# Patient Record
Sex: FEMALE | Race: WHITE | NOT HISPANIC OR LATINO | Employment: OTHER | URBAN - METROPOLITAN AREA
[De-identification: names, ages, dates, MRNs, and addresses within clinical notes are randomized per-mention and may not be internally consistent; named-entity substitution may affect disease eponyms.]

---

## 2017-02-10 DIAGNOSIS — M79.642 BILATERAL HAND PAIN: Primary | ICD-10-CM

## 2017-02-10 DIAGNOSIS — M79.641 BILATERAL HAND PAIN: Primary | ICD-10-CM

## 2017-02-13 ENCOUNTER — OFFICE VISIT (OUTPATIENT)
Dept: ORTHOPEDICS | Facility: CLINIC | Age: 82
End: 2017-02-13
Payer: MEDICARE

## 2017-02-13 ENCOUNTER — HOSPITAL ENCOUNTER (OUTPATIENT)
Dept: RADIOLOGY | Facility: HOSPITAL | Age: 82
Discharge: HOME | End: 2017-02-13
Attending: ORTHOPAEDIC SURGERY
Payer: MEDICARE

## 2017-02-13 VITALS
HEART RATE: 73 BPM | WEIGHT: 145 LBS | HEIGHT: 64 IN | SYSTOLIC BLOOD PRESSURE: 141 MMHG | BODY MASS INDEX: 24.75 KG/M2 | DIASTOLIC BLOOD PRESSURE: 73 MMHG

## 2017-02-13 DIAGNOSIS — G56.03 BILATERAL CARPAL TUNNEL SYNDROME: Primary | ICD-10-CM

## 2017-02-13 DIAGNOSIS — M19.049 OSTEOARTHRITIS, HAND, PRIMARY LOCALIZED, UNSPECIFIED LATERALITY: ICD-10-CM

## 2017-02-13 DIAGNOSIS — M79.641 BILATERAL HAND PAIN: ICD-10-CM

## 2017-02-13 DIAGNOSIS — M79.642 BILATERAL HAND PAIN: ICD-10-CM

## 2017-02-13 PROCEDURE — 73130 X-RAY EXAM OF HAND: CPT | Mod: 26,RT,, | Performed by: RADIOLOGY

## 2017-02-13 PROCEDURE — 1159F MED LIST DOCD IN RCRD: CPT | Mod: S$GLB,,, | Performed by: ORTHOPAEDIC SURGERY

## 2017-02-13 PROCEDURE — 99213 OFFICE O/P EST LOW 20 MIN: CPT | Mod: 25,S$GLB,, | Performed by: ORTHOPAEDIC SURGERY

## 2017-02-13 PROCEDURE — 1157F ADVNC CARE PLAN IN RCRD: CPT | Mod: S$GLB,,, | Performed by: ORTHOPAEDIC SURGERY

## 2017-02-13 PROCEDURE — 1160F RVW MEDS BY RX/DR IN RCRD: CPT | Mod: S$GLB,,, | Performed by: ORTHOPAEDIC SURGERY

## 2017-02-13 PROCEDURE — 73130 X-RAY EXAM OF HAND: CPT | Mod: 26,LT,, | Performed by: RADIOLOGY

## 2017-02-13 PROCEDURE — 99999 PR PBB SHADOW E&M-EST. PATIENT-LVL III: CPT | Mod: PBBFAC,,, | Performed by: ORTHOPAEDIC SURGERY

## 2017-02-13 PROCEDURE — 1125F AMNT PAIN NOTED PAIN PRSNT: CPT | Mod: S$GLB,,, | Performed by: ORTHOPAEDIC SURGERY

## 2017-02-15 PROCEDURE — 20526 THER INJECTION CARP TUNNEL: CPT | Mod: 50,S$GLB,, | Performed by: ORTHOPAEDIC SURGERY

## 2017-02-15 RX ORDER — TRIAMCINOLONE ACETONIDE 40 MG/ML
40 INJECTION, SUSPENSION INTRA-ARTICULAR; INTRAMUSCULAR
Status: DISCONTINUED | OUTPATIENT
Start: 2017-02-15 | End: 2017-02-15 | Stop reason: HOSPADM

## 2017-02-15 RX ADMIN — TRIAMCINOLONE ACETONIDE 40 MG: 40 INJECTION, SUSPENSION INTRA-ARTICULAR; INTRAMUSCULAR at 07:02

## 2017-02-15 NOTE — PROGRESS NOTES
Past Medical History   Diagnosis Date    CHF (congestive heart failure)     Coronary artery disease     Depression     Fractures        Past Surgical History   Procedure Laterality Date    Hysterectomy      Cholecystectomy      Tonsillectomy      Breast surgery      Back surgery      Bladder suspension      Hip surgery      Knee surgery Right      1999       Current Outpatient Prescriptions   Medication Sig    artificial tears (ISOPTO TEARS) 0.5 % ophthalmic solution Place 1 drop into both eyes 3 (three) times daily as needed.    aspirin 81 MG Chew Take 81 mg by mouth once daily.      CALCIUM & MAGNESIUM CARBONATES ORAL Take by mouth.    carvedilol (COREG) 12.5 MG tablet Take 12.5 mg by mouth every evening.     carvedilol (COREG) 3.125 MG tablet Take 3.125 mg by mouth 2 (two) times daily.    citalopram (CELEXA) 20 MG tablet Take 20 mg by mouth once daily.    clopidogrel (PLAVIX) 75 mg tablet Take 75 mg by mouth once daily.      coenzyme Q10 100 mg capsule Take 100 mg by mouth once daily.    cyanocobalamin (VITAMIN B-12) 500 MCG tablet Take 500 mcg by mouth once daily.    cyclobenzaprine (FLEXERIL) 10 MG tablet Take 1 tablet (10 mg total) by mouth 3 (three) times daily as needed.    diclofenac sodium 1 % Gel Apply 2 g topically 2 (two) times daily.    dicyclomine (BENTYL) 10 MG capsule     estradiol (VIVELLE-DOT) 0.05 mg/24 hr Place 1 patch onto the skin once a week.      expressed human breast milk (EXPRESSED HUMAN BREAST MILK) APPLY 1-2 GRAMS TO AFFECTED AREA 3-4 TIMES DAILY. RUB IN THOROUGHLY    fesoterodine (TOVIAZ) 4 mg Tb24 Take 4 mg by mouth once daily.    furosemide (LASIX) 40 MG tablet Take 40 mg by mouth once daily.     multivitamin capsule Take 1 capsule by mouth once daily.    omega-3 acid ethyl esters (LOVAZA) 1 gram capsule Take 2 g by mouth 2 (two) times daily.    polyethylene glycol (GLYCOLAX) 17 gram PwPk Take by mouth.    potassium chloride (KLOR-CON) 10 MEQ TbSR Take  10 mEq by mouth once daily.      spironolactone (ALDACTONE) 25 MG tablet Take 25 mg by mouth once daily.      tramadol (ULTRAM) 50 mg tablet Take 1 tablet (50 mg total) by mouth every 6 (six) hours as needed for Pain.    pantoprazole (PROTONIX) 40 MG tablet Take 1 tablet (40 mg total) by mouth once daily.     No current facility-administered medications for this visit.        Review of patient's allergies indicates:  No Known Allergies    History reviewed. No pertinent family history.    Social History     Social History    Marital status:      Spouse name: N/A    Number of children: N/A    Years of education: N/A     Occupational History    Not on file.     Social History Main Topics    Smoking status: Never Smoker    Smokeless tobacco: Not on file    Alcohol use Not on file    Drug use: No    Sexual activity: No     Other Topics Concern    Not on file     Social History Narrative       Chief Complaint:   Chief Complaint   Patient presents with    Hand Pain     bilateral hand pain       History: This in a 88-year-old female comes in with left shoulder pain now.  Pain has been present for about a year.  No injury noted.  Pain is getting significantly worse.  She is unable to lay on that shoulder raise her arm above her head.  Patient enters a crunching noise.  Pain is not constant but pretty significant.  She rates her pain as a 7 out of 10.  No prior treatment noted.    Present: She comes in today for worsening bilateral hand pain.  Patient has a history of carpal tunnel syndrome.  We injected both carpal tunnels back in June.  She got several months of relief.  Pain over the last few weeks.  Pain is back up to 10 out of 10.    Review of Systems:      Musculoskeletal: See history of present illness.      Physical Examination:    Vital Signs:    Vitals:    02/13/17 1509   BP: (!) 141/73   Pulse: 73       This a well-developed, well nourished patient in no acute distress.  They are alert and  oriented and cooperative to examination.  Pt. walks without an antalgic gait.      Examination of bilateral hand and wrist shows no signs of rashes or erythema. Patient has no masses ecchymosis or effusions. Patient has full range of motion of the wrist in flexion and extension as well as ulnar and radial deviation. The patient also has full range of motion of all joints in the hand. There are 2+ radial pulse and intact light touch sensation in all 5 digits.  Positive median Tinel's.      X-rays: 4 views of the left shoulder are reviewed which show arthritis and findings consistent with rotator cuff arthropathy  3 views of bilateral hands are  ordered and reviewed which show severe arthritic changes with bony deformity.     Assessment::   Bilateral severe hand arthritis.   bilateral carpal tunnel syndrome    Plan:  I reviewed the findings the patient and her daughter today.  We talked about treatment options.  Injected both carpal tunnels again.  Follow-up as needed.

## 2017-02-15 NOTE — PROCEDURES
Carpal Tunnel  Date/Time: 2/15/2017 7:10 AM  Performed by: KADI BASURTO  Authorized by: KADI BASURTO     Consent Done?:  Yes (Verbal)  Indications:  Pain  Site marked: The procedure site was marked    Timeout: Prior to procedure the correct patient, procedure, and site was verified      Location:  Wrist  Site:  R radiocarpal and L radiocarpal  Prep: Patient was prepped and draped in usual sterile fashion    Ultrasonic Guidance for needle placement: No  Needle size:  22 G  Approach:  Volar  Medications:  40 mg triamcinolone acetonide 40 mg/mL; 40 mg triamcinolone acetonide 40 mg/mL  Patient tolerance:  Patient tolerated the procedure well with no immediate complications

## 2017-03-28 ENCOUNTER — TELEPHONE (OUTPATIENT)
Dept: ORTHOPEDICS | Facility: CLINIC | Age: 82
End: 2017-03-28

## 2017-03-28 NOTE — TELEPHONE ENCOUNTER
----- Message from Stacey M Lefort sent at 3/28/2017  1:48 PM CDT -----  Contact: Anna Julian - Daughter - [phone]  Patient needs a refill on her pain cream. She uses [facility] mail order. Daughter would like a callback at [phone]. Thank you.

## 2017-07-31 ENCOUNTER — HOSPITAL ENCOUNTER (EMERGENCY)
Facility: HOSPITAL | Age: 82
Discharge: HOME | End: 2017-07-31
Attending: EMERGENCY MEDICINE
Payer: MEDICARE

## 2017-07-31 VITALS
RESPIRATION RATE: 12 BRPM | SYSTOLIC BLOOD PRESSURE: 146 MMHG | TEMPERATURE: 99 F | HEIGHT: 64 IN | HEART RATE: 73 BPM | BODY MASS INDEX: 24.75 KG/M2 | DIASTOLIC BLOOD PRESSURE: 63 MMHG | OXYGEN SATURATION: 99 % | WEIGHT: 145 LBS

## 2017-07-31 DIAGNOSIS — S81.801A LEG WOUND, RIGHT, INITIAL ENCOUNTER: Primary | ICD-10-CM

## 2017-07-31 PROCEDURE — 99283 EMERGENCY DEPT VISIT LOW MDM: CPT

## 2017-07-31 RX ORDER — CEPHALEXIN 500 MG/1
500 CAPSULE ORAL EVERY 12 HOURS
Qty: 14 CAPSULE | Refills: 0 | Status: SHIPPED | OUTPATIENT
Start: 2017-07-31 | End: 2017-08-07

## 2017-07-31 NOTE — ED NOTES
Pt presents with wound that is not healing after 10 days of Doxycycline. Wound area has eschar at center, skin is dry, no drainage or odor. Good blood flow. Neg Homans sign.

## 2017-07-31 NOTE — ED PROVIDER NOTES
Encounter Date: 7/31/2017       History     Chief Complaint   Patient presents with    Leg Injury     Posterior R lower leg wound from 4 weeks ago. Continues with pain after Doxycycline 100 bid x 10 days.     Patient is an 88 year old female with complaint of wound to the right lower extremity that has been present for more than two weeks. She states PMH significant for CHF, CAD and depression. Daughter states the initial injury was a scrape to the back of the leg. She went to Urgent Care and was started on doxycyline for 10 days. The daughter reports the wound is still present. They reports mild improvement in the redness surroudning the wound. They denied drainage and fever. She denied chills.       The history is provided by the patient and a relative.     Review of patient's allergies indicates:  No Known Allergies  Past Medical History:   Diagnosis Date    CHF (congestive heart failure)     Coronary artery disease     Depression     Fractures      Past Surgical History:   Procedure Laterality Date    BACK SURGERY      BLADDER SUSPENSION      BREAST SURGERY      CHOLECYSTECTOMY      HIP SURGERY      HYSTERECTOMY      KNEE SURGERY Right     1999    TONSILLECTOMY       History reviewed. No pertinent family history.  Social History   Substance Use Topics    Smoking status: Never Smoker    Smokeless tobacco: Never Used    Alcohol use Not on file     Review of Systems   Constitutional: Negative for chills and fever.   HENT: Negative for congestion and sore throat.    Respiratory: Negative for cough and shortness of breath.    Cardiovascular: Negative for chest pain.   Gastrointestinal: Negative for abdominal pain, diarrhea, nausea and vomiting.   Genitourinary: Negative for dysuria.   Musculoskeletal: Negative for back pain.   Skin: Positive for wound. Negative for rash.   Neurological: Negative for weakness.   Hematological: Does not bruise/bleed easily.       Physical Exam     Initial Vitals  [07/31/17 1349]   BP Pulse Resp Temp SpO2   (!) 146/63 73 12 98.8 °F (37.1 °C) 99 %      MAP       90.67         Physical Exam    Nursing note and vitals reviewed.  Constitutional: She appears well-developed and well-nourished. No distress.   HENT:   Head: Normocephalic and atraumatic.   Right Ear: External ear normal.   Left Ear: External ear normal.   Nose: Nose normal.   Eyes: Conjunctivae are normal. Pupils are equal, round, and reactive to light. Right eye exhibits no discharge. Left eye exhibits no discharge.   Neck: Normal range of motion. Neck supple.   Cardiovascular: Normal rate, regular rhythm and normal heart sounds. Exam reveals no gallop and no friction rub.    No murmur heard.  Pulmonary/Chest: Breath sounds normal. She has no wheezes. She has no rhonchi. She has no rales.   Abdominal: Soft. Bowel sounds are normal. There is no tenderness. There is no guarding.   Musculoskeletal: Normal range of motion.   Neurological: She is alert.   Skin: Skin is warm and dry.              ED Course   Procedures  Labs Reviewed - No data to display          Medical Decision Making:   History:   I obtained history from: someone other than patient.  Old Medical Records: I decided to obtain old medical records.       APC / Resident Notes:   This is an emergent evaluation of an 88-year-old female with complaint of wound to the right lower extremity.  She states she completed a 10 day course of doxycycline with no significant improvement.  She is well-appearing.  Vital signs are stable.  She denied fever.  There is a 2 cm x 2 cm black eschar noted.  There is no abscess, erythema, drainage or warmth.  There is minimal tenderness.  No significant sign of infection at this time.  Pulses are noted.  Her cap refills less than 3 seconds.  There is no concern for compromise blood flow at this time.  We'll give a short course of Keflex and have her follow up with outpatient wound care.  Case management was working to set up wound  care.  Daughter voiced understanding. Discussed results with patient. Return precautions given. Patient is to follow up with their primary care provider. Case was discussed with Dr. June who has evaluated the patient and is in agreement with the plan of care. All questions answered.            Attending Attestation:     Physician Attestation Statement for NP/PA:   I have conducted a face to face encounter with this patient in addition to the NP/PA, due to Medical Complexity    Other NP/PA Attestation Additions:      Medical Decision Making: There does not appear to be any significant acute infection. I do have some concern that dry gangrene may be developing.  There is no subcutaneous gas.  I will discharge the patient with antibiotics and instructions to follow up with wound care.  She is neurovascularly intact distally.                 ED Course     Clinical Impression:   The encounter diagnosis was Leg wound, right, initial encounter.                           Rebecca Bah PA-C  07/31/17 9936       Samuel June MD  07/31/17 2997

## 2017-07-31 NOTE — DISCHARGE INSTRUCTIONS
Keep wound clean and dry.  Take antibiotics as prescribed.  Wound clinic will call you.  See her primary care provider in one week.  For worsening symptoms, chest pain, shortness of breath, increased abdominal pain, high grade fever, stroke or stroke like symptoms, immediately go to the nearest Emergency Room or call 911 as soon as possible.

## 2017-08-08 DIAGNOSIS — M25.561 PAIN IN BOTH KNEES, UNSPECIFIED CHRONICITY: Primary | ICD-10-CM

## 2017-08-08 DIAGNOSIS — M25.562 PAIN IN BOTH KNEES, UNSPECIFIED CHRONICITY: Primary | ICD-10-CM

## 2017-08-19 ENCOUNTER — HOSPITAL ENCOUNTER (EMERGENCY)
Facility: HOSPITAL | Age: 82
Discharge: HOME | End: 2017-08-19
Attending: EMERGENCY MEDICINE
Payer: MEDICARE

## 2017-08-19 VITALS
WEIGHT: 149 LBS | OXYGEN SATURATION: 96 % | HEART RATE: 75 BPM | SYSTOLIC BLOOD PRESSURE: 159 MMHG | BODY MASS INDEX: 25.58 KG/M2 | TEMPERATURE: 98 F | RESPIRATION RATE: 16 BRPM | DIASTOLIC BLOOD PRESSURE: 68 MMHG

## 2017-08-19 DIAGNOSIS — M25.561 RIGHT KNEE PAIN: ICD-10-CM

## 2017-08-19 PROCEDURE — 25000003 PHARM REV CODE 250: Performed by: EMERGENCY MEDICINE

## 2017-08-19 PROCEDURE — 99283 EMERGENCY DEPT VISIT LOW MDM: CPT

## 2017-08-19 RX ORDER — ACETAMINOPHEN 325 MG/1
650 TABLET ORAL
Status: COMPLETED | OUTPATIENT
Start: 2017-08-19 | End: 2017-08-19

## 2017-08-19 RX ORDER — DOXYCYCLINE HYCLATE 100 MG/1
100 TABLET, DELAYED RELEASE ORAL 2 TIMES DAILY
Status: ON HOLD | COMMUNITY
End: 2017-11-06

## 2017-08-19 RX ADMIN — ACETAMINOPHEN 650 MG: 325 TABLET ORAL at 01:08

## 2017-08-19 NOTE — ED PROVIDER NOTES
"Encounter Date: 8/19/2017    SCRIBE #1 NOTE: I, Arron Bowens, am scribing for, and in the presence of, Dr. Amaya .       History     Chief Complaint   Patient presents with    Knee Pain     right knee "popped out"     08/19/2017  1:37 PM     Chief Complaint: knee problem     The patient is a 88 y.o. female presents to the ED c/o right knee pain with swelling that worsened this AM. Pt reports "popping" of the knee out of place upon standing up that began months ago. She has been treating pain with Tylenol. Pt reports a knee replacement performed 17 years ago and has been seeing Dr. Palacios. Pt uses a walker and reports chronic difficulty walking. No recent falls or fevers. No numbness or weakness in knees. PMHx of CHF, CAD, depression and Fractures and PSHx of hysterectomy, cholecystectomy, breast surgery, back surgery, bladder suspension, hip surgery.        The history is provided by the patient, a relative and medical records.     Review of patient's allergies indicates:  No Known Allergies  Past Medical History:   Diagnosis Date    CHF (congestive heart failure)     Coronary artery disease     Depression     Fractures      Past Surgical History:   Procedure Laterality Date    BACK SURGERY      BLADDER SUSPENSION      BREAST SURGERY      CHOLECYSTECTOMY      HIP SURGERY      HYSTERECTOMY      KNEE SURGERY Right     1999    TONSILLECTOMY       History reviewed. No pertinent family history.  Social History   Substance Use Topics    Smoking status: Never Smoker    Smokeless tobacco: Never Used    Alcohol use Not on file     Review of Systems   Constitutional: Negative for fever.   HENT: Negative for sore throat.    Respiratory: Negative for shortness of breath.    Cardiovascular: Negative for chest pain.   Gastrointestinal: Negative for nausea.   Genitourinary: Negative for dysuria.   Musculoskeletal: Positive for arthralgias (Right knee). Negative for back pain.   Skin: Negative for rash. "   Neurological: Negative for weakness and numbness.   Hematological: Does not bruise/bleed easily.       Physical Exam     Initial Vitals [08/19/17 1244]   BP Pulse Resp Temp SpO2   (!) 159/68 75 16 97.5 °F (36.4 °C) 96 %      MAP       98.33         Physical Exam    Nursing note and vitals reviewed.  Constitutional: She appears well-developed. No distress.   HENT:   Head: Normocephalic and atraumatic.   Eyes: EOM are normal.   Neck: Normal range of motion.   Cardiovascular: Normal rate and normal heart sounds.   Pulses:       Dorsalis pedis pulses are 2+ on the right side, and 2+ on the left side.        Posterior tibial pulses are 2+ on the right side, and 2+ on the left side.   Pulmonary/Chest: Breath sounds normal. No respiratory distress.   Musculoskeletal:   small right joint effusion, no erythema. Minimal diffuse anterior tenderness, FROM. No minimal tenderness to palpation.      Neurological: She is alert and oriented to person, place, and time. She has normal strength. No sensory deficit.   Skin: Skin is warm and dry. No erythema.         ED Course   Procedures  Labs Reviewed - No data to display              Imaging Results          X-Ray Knee 3 View Right (Final result)  Result time 08/19/17 14:21:36    Final result by Angelika Collins MD (08/19/17 14:21:36)                 Impression:        Right TKA.  No acute fracture or dislocation.      Electronically signed by: ANGELIKA COLLINS MD  Date:     08/19/17  Time:    14:21              Narrative:    Right knee 3 views AP, lateral, merchant    There is right TKA appearing in good position and alignment.  Extensive arterial calcifications.  There are suprapatellar calcification that could be vascular or other soft tissue calcifications.  No acute fracture or dislocation is seen.                            (radiology reading, visualized by me)          Scribe Attestation:   Scribe #1: I performed the above scribed service and the documentation accurately  describes the services I performed. I attest to the accuracy of the note.    Attending Attestation:           Physician Attestation for Scribe:  Physician Attestation Statement for Scribe #1: I, Dr. Amaya, reviewed documentation, as scribed by Arron Bowens  in my presence, and it is both accurate and complete.         Jessenia Mckeon is a 88 y.o. female presenting with acute on chronic right knee pain with history of remote right knee replacement.  No trauma.  She has full active range of motion and is distally neurovascular intact.  X-ray shows no sign of hardware disruption, fracture, dislocation.  I doubt dislocation/relocation with vascular injury.  Last sensation of popping occurred hours before current presentation.  This has been recurrent phenomenon.  She has close orthopedic follow-up in 2 days.  I did attempt to call Dr. Molina whom they will be seeing at their request.  He is not on-call and I was not able to reach him.  Fall precautions with NSAIDs as necessary reviewed pending close follow-up.  Detailed return precautions reviewed.  Very low suspicion for separate process such as septic joint.        ED Course     Clinical Impression:   The encounter diagnosis was Right knee pain.                           William Amaya MD  08/19/17 3100

## 2017-08-21 ENCOUNTER — OFFICE VISIT (OUTPATIENT)
Dept: ORTHOPEDICS | Facility: CLINIC | Age: 82
End: 2017-08-21
Attending: ORTHOPAEDIC SURGERY
Payer: MEDICARE

## 2017-08-21 ENCOUNTER — HOSPITAL ENCOUNTER (OUTPATIENT)
Dept: RADIOLOGY | Facility: HOSPITAL | Age: 82
Discharge: HOME | End: 2017-08-21
Attending: ORTHOPAEDIC SURGERY
Payer: MEDICARE

## 2017-08-21 VITALS
WEIGHT: 149 LBS | DIASTOLIC BLOOD PRESSURE: 64 MMHG | HEART RATE: 57 BPM | BODY MASS INDEX: 25.44 KG/M2 | SYSTOLIC BLOOD PRESSURE: 136 MMHG | HEIGHT: 64 IN

## 2017-08-21 DIAGNOSIS — M17.12 ARTHRITIS OF KNEE, LEFT: ICD-10-CM

## 2017-08-21 DIAGNOSIS — T84.84XA PAIN DUE TO TOTAL RIGHT KNEE REPLACEMENT, INITIAL ENCOUNTER: ICD-10-CM

## 2017-08-21 DIAGNOSIS — M25.561 PAIN IN BOTH KNEES, UNSPECIFIED CHRONICITY: ICD-10-CM

## 2017-08-21 DIAGNOSIS — G56.03 BILATERAL CARPAL TUNNEL SYNDROME: Primary | ICD-10-CM

## 2017-08-21 DIAGNOSIS — Z96.651 PAIN DUE TO TOTAL RIGHT KNEE REPLACEMENT, INITIAL ENCOUNTER: ICD-10-CM

## 2017-08-21 DIAGNOSIS — M25.562 PAIN IN BOTH KNEES, UNSPECIFIED CHRONICITY: ICD-10-CM

## 2017-08-21 PROCEDURE — 20526 THER INJECTION CARP TUNNEL: CPT | Mod: LT,S$GLB,, | Performed by: ORTHOPAEDIC SURGERY

## 2017-08-21 PROCEDURE — 20610 DRAIN/INJ JOINT/BURSA W/O US: CPT | Mod: 59,LT,S$GLB, | Performed by: ORTHOPAEDIC SURGERY

## 2017-08-21 PROCEDURE — 73564 X-RAY EXAM KNEE 4 OR MORE: CPT | Mod: 26,LT,, | Performed by: RADIOLOGY

## 2017-08-21 PROCEDURE — 1125F AMNT PAIN NOTED PAIN PRSNT: CPT | Mod: S$GLB,,, | Performed by: ORTHOPAEDIC SURGERY

## 2017-08-21 PROCEDURE — 3008F BODY MASS INDEX DOCD: CPT | Mod: S$GLB,,, | Performed by: ORTHOPAEDIC SURGERY

## 2017-08-21 PROCEDURE — 73564 X-RAY EXAM KNEE 4 OR MORE: CPT | Mod: 26,RT,, | Performed by: RADIOLOGY

## 2017-08-21 PROCEDURE — 73564 X-RAY EXAM KNEE 4 OR MORE: CPT | Mod: TC,50,PN

## 2017-08-21 PROCEDURE — 1159F MED LIST DOCD IN RCRD: CPT | Mod: S$GLB,,, | Performed by: ORTHOPAEDIC SURGERY

## 2017-08-21 PROCEDURE — 99214 OFFICE O/P EST MOD 30 MIN: CPT | Mod: 25,S$GLB,, | Performed by: ORTHOPAEDIC SURGERY

## 2017-08-21 PROCEDURE — 99999 PR PBB SHADOW E&M-EST. PATIENT-LVL III: CPT | Mod: PBBFAC,,, | Performed by: ORTHOPAEDIC SURGERY

## 2017-08-21 RX ADMIN — TRIAMCINOLONE ACETONIDE 40 MG: 40 INJECTION, SUSPENSION INTRA-ARTICULAR; INTRAMUSCULAR at 07:08

## 2017-08-22 ENCOUNTER — TELEPHONE (OUTPATIENT)
Dept: ORTHOPEDICS | Facility: CLINIC | Age: 82
End: 2017-08-22

## 2017-08-22 NOTE — TELEPHONE ENCOUNTER
----- Message from Camila Wing LPN sent at 8/21/2017  4:20 PM CDT -----  Pt being referred to Dr. Rios by Dr. Palacios for right loose total knee replacement. Please call pt to make appointment. Thanks!

## 2017-08-22 NOTE — TELEPHONE ENCOUNTER
Spoke to Anna and the pt was given an appointment with  on 9/20/17. The appointment slip was mailed to pt home.

## 2017-08-23 ENCOUNTER — TELEPHONE (OUTPATIENT)
Dept: ORTHOPEDICS | Facility: CLINIC | Age: 82
End: 2017-08-23

## 2017-08-23 PROBLEM — M17.12 ARTHRITIS OF KNEE, LEFT: Status: ACTIVE | Noted: 2017-08-23

## 2017-08-23 PROBLEM — Z96.651 PAIN DUE TO TOTAL RIGHT KNEE REPLACEMENT: Status: ACTIVE | Noted: 2017-08-23

## 2017-08-23 PROBLEM — T84.84XA PAIN DUE TO TOTAL RIGHT KNEE REPLACEMENT: Status: ACTIVE | Noted: 2017-08-23

## 2017-08-23 RX ORDER — TRIAMCINOLONE ACETONIDE 40 MG/ML
40 INJECTION, SUSPENSION INTRA-ARTICULAR; INTRAMUSCULAR
Status: DISCONTINUED | OUTPATIENT
Start: 2017-08-21 | End: 2017-08-23 | Stop reason: HOSPADM

## 2017-08-23 NOTE — PROCEDURES
Large Joint Aspiration/Injection  Date/Time: 8/21/2017 7:31 AM  Performed by: KADI BASURTO  Authorized by: KADI BASURTO     Consent Done?:  Yes (Verbal)  Indications:  Pain  Procedure site marked: Yes    Timeout: Prior to procedure the correct patient, procedure, and site was verified      Location:  Knee  Site:  L knee  Prep: Patient was prepped and draped in usual sterile fashion    Needle size:  20 G  Approach:  Anterolateral  Medications:  40 mg triamcinolone acetonide 40 mg/mL  Patient tolerance:  Patient tolerated the procedure well with no immediate complications

## 2017-08-23 NOTE — PROCEDURES
Carpal Tunnel  Date/Time: 8/21/2017 7:31 AM  Performed by: KADI BASURTO  Authorized by: KADI BASURTO     Consent Done?: Yes (Verbal)  Indications: Pain  Site marked: The procedure site was marked    Timeout: Prior to procedure the correct patient, procedure, and site was verified      Location:  Wrist (L carpal tunnel)  Wrist joint: B carpal tunnel.  Prep: Patient was prepped and draped in usual sterile fashion    Needle size:  21 G  Approach:  Volar  Medications:  40 mg triamcinolone acetonide 40 mg/mL  Patient tolerance:  Patient tolerated the procedure well with no immediate complications

## 2017-08-23 NOTE — PROGRESS NOTES
Past Medical History:   Diagnosis Date    CHF (congestive heart failure)     Coronary artery disease     Depression     Fractures        Past Surgical History:   Procedure Laterality Date    BACK SURGERY      BLADDER SUSPENSION      BREAST SURGERY      CHOLECYSTECTOMY      HIP SURGERY      HYSTERECTOMY      KNEE SURGERY Right     1999    TONSILLECTOMY         Current Outpatient Prescriptions   Medication Sig    artificial tears (ISOPTO TEARS) 0.5 % ophthalmic solution Place 1 drop into both eyes 3 (three) times daily as needed.    aspirin 81 MG Chew Take 81 mg by mouth once daily.      CALCIUM & MAGNESIUM CARBONATES ORAL Take by mouth.    carvedilol (COREG) 12.5 MG tablet Take 12.5 mg by mouth every evening.     carvedilol (COREG) 3.125 MG tablet Take 3.125 mg by mouth 2 (two) times daily.    citalopram (CELEXA) 20 MG tablet Take 20 mg by mouth once daily.    clopidogrel (PLAVIX) 75 mg tablet Take 75 mg by mouth once daily.      coenzyme Q10 100 mg capsule Take 100 mg by mouth once daily.    cyanocobalamin (VITAMIN B-12) 500 MCG tablet Take 500 mcg by mouth once daily.    cyclobenzaprine (FLEXERIL) 10 MG tablet Take 1 tablet (10 mg total) by mouth 3 (three) times daily as needed.    diclofenac sodium 1 % Gel Apply 2 g topically 2 (two) times daily.    dicyclomine (BENTYL) 10 MG capsule     doxycycline (DORYX) 100 MG EC tablet Take 100 mg by mouth 2 (two) times daily.    estradiol (VIVELLE-DOT) 0.05 mg/24 hr Place 1 patch onto the skin once a week.      expressed human breast milk (EXPRESSED HUMAN BREAST MILK) APPLY 1-2 GRAMS TO AFFECTED AREA 3-4 TIMES DAILY. RUB IN THOROUGHLY    fesoterodine (TOVIAZ) 4 mg Tb24 Take 4 mg by mouth once daily.    furosemide (LASIX) 40 MG tablet Take 40 mg by mouth once daily.     multivitamin capsule Take 1 capsule by mouth once daily.    omega-3 acid ethyl esters (LOVAZA) 1 gram capsule Take 2 g by mouth 2 (two) times daily.    polyethylene glycol  (GLYCOLAX) 17 gram PwPk Take by mouth.    potassium chloride (KLOR-CON) 10 MEQ TbSR Take 10 mEq by mouth once daily.      spironolactone (ALDACTONE) 25 MG tablet Take 25 mg by mouth once daily.      pantoprazole (PROTONIX) 40 MG tablet Take 1 tablet (40 mg total) by mouth once daily.     No current facility-administered medications for this visit.        Review of patient's allergies indicates:  No Known Allergies    History reviewed. No pertinent family history.    Social History     Social History    Marital status:      Spouse name: N/A    Number of children: N/A    Years of education: N/A     Occupational History    Not on file.     Social History Main Topics    Smoking status: Never Smoker    Smokeless tobacco: Never Used    Alcohol use Not on file    Drug use: No    Sexual activity: No     Other Topics Concern    Not on file     Social History Narrative    No narrative on file       Chief Complaint:   Chief Complaint   Patient presents with    Knee Pain     bilateral     Hand Pain     bilateral       History: This in a 88-year-old female comes in with left shoulder pain now.  Pain has been present for about a year.  No injury noted.  Pain is getting significantly worse.  She is unable to lay on that shoulder raise her arm above her head.  Patient enters a crunching noise.  Pain is not constant but pretty significant.  She rates her pain as a 7 out of 10.  No prior treatment noted.    Present: She comes in today for worsening bilateral hand pain and knee.  Patient has a history of carpal tunnel syndrome.  We injected both carpal tunnels previously with decent success.  She got several months of relief.  Pain over the last few weeks.  Pain is back up to 10 out of 10.  Patient also complains of significant bilateral knee pain.  The right knee is the worst of the 2.  Knee feels like it pops in and out.  She had to go the ER a while back.  Pain with standing and walking.  Patient had a knee  replacement done in 2000.    Review of Systems:      Musculoskeletal: See history of present illness.      Physical Examination:    Vital Signs:    Vitals:    08/21/17 1509   BP: 136/64   Pulse: (!) 57       This a well-developed, well nourished patient in no acute distress.  They are alert and oriented and cooperative to examination.  Pt. walks without an antalgic gait.      Examination of bilateral hand and wrist shows no signs of rashes or erythema. Patient has no masses ecchymosis or effusions. Patient has full range of motion of the wrist in flexion and extension as well as ulnar and radial deviation. The patient also has full range of motion of all joints in the hand. There are 2+ radial pulse and intact light touch sensation in all 5 digits.  Positive median Tinel's.    Examination of the right knee shows no rashes or erythema. There are no masses ecchymosis or effusion. Patient has limited range of motion with significant stiffness and guarding. Patient has a positive  drawer exam.Knee is stable to varus and valgus stress.  4 out of 5 motor strength. Palpable distal pulses. Intact light touch sensation. Negative Patellofemoral crepitus      X-rays: 4 views of the left shoulder are reviewed which show arthritis and findings consistent with rotator cuff arthropathy  3 views of bilateral hands are  ordered and reviewed which show severe arthritic changes with bony deformity.  X-rays of both knees are ordered and reviewed which show some subluxation of the right knee possibly from polyethylene wear.  Severe left knee arthritis     Assessment::   Bilateral severe hand arthritis.   bilateral carpal tunnel syndrome  Left knee arthritis  Right total knee failure with subluxation    Plan:  I reviewed the findings the patient and her daughter today.  We talked about treatment options.  Injected both carpal tunnels again.  I offered her an injection in the left knee.  We talked about the right knee.  I recommended a  brace at this time but we need to get her an with one of the joint reconstruction specialist such as Dr. Rios or Dr. Barlow.

## 2017-08-23 NOTE — TELEPHONE ENCOUNTER
Patients daughter was calling to ask why Dr. Palacios referred her to Dr. Rios. Advised her that Dr. Palacios wanted her to see a joint reconstruction specialists because Dr. Palacios does not perform the type of surgery that she requires. She verbalized understanding.

## 2017-08-23 NOTE — TELEPHONE ENCOUNTER
----- Message from Kahlil Chapin sent at 8/23/2017 10:36 AM CDT -----  Contact: Daughter,Anna Castillo want to speak with a nurse regarding patient knee surgery please call back at [phone]

## 2017-09-20 ENCOUNTER — OFFICE VISIT (OUTPATIENT)
Dept: ORTHOPEDICS | Facility: CLINIC | Age: 82
End: 2017-09-20
Payer: MEDICARE

## 2017-09-20 VITALS — BODY MASS INDEX: 25.44 KG/M2 | HEIGHT: 64 IN | WEIGHT: 149 LBS

## 2017-09-20 DIAGNOSIS — T84.018A FAILED TOTAL KNEE ARTHROPLASTY, INITIAL ENCOUNTER: Primary | ICD-10-CM

## 2017-09-20 DIAGNOSIS — Z96.659 FAILED TOTAL KNEE ARTHROPLASTY, INITIAL ENCOUNTER: Primary | ICD-10-CM

## 2017-09-20 DIAGNOSIS — S81.801S OPEN WOUND, LOWER LEG, RIGHT, SEQUELA: ICD-10-CM

## 2017-09-20 DIAGNOSIS — M25.361 UNSTABLE RIGHT KNEE: ICD-10-CM

## 2017-09-20 PROCEDURE — 1159F MED LIST DOCD IN RCRD: CPT | Mod: S$GLB,,, | Performed by: ORTHOPAEDIC SURGERY

## 2017-09-20 PROCEDURE — 99999 PR PBB SHADOW E&M-EST. PATIENT-LVL III: CPT | Mod: PBBFAC,,, | Performed by: ORTHOPAEDIC SURGERY

## 2017-09-20 PROCEDURE — 1125F AMNT PAIN NOTED PAIN PRSNT: CPT | Mod: S$GLB,,, | Performed by: ORTHOPAEDIC SURGERY

## 2017-09-20 PROCEDURE — 99214 OFFICE O/P EST MOD 30 MIN: CPT | Mod: S$GLB,,, | Performed by: ORTHOPAEDIC SURGERY

## 2017-09-20 PROCEDURE — 3008F BODY MASS INDEX DOCD: CPT | Mod: S$GLB,,, | Performed by: ORTHOPAEDIC SURGERY

## 2017-09-20 NOTE — LETTER
September 20, 2017      Abdi Palacios MD  [address] [place] [address] Dr Mariana MOREJON [id number]           [facility]  [address]  [facility] [address]  Phone: [phone]          Patient: Jessenia Mckeon   MR Number: [id number]   YOB: 1928   Date of Visit: 9/20/2017       Dear Dr. Abdi Palacios:    Thank you for referring Jessenia Mckeon to me for evaluation. Attached you will find relevant portions of my assessment and plan of care.    If you have questions, please do not hesitate to call me. I look forward to following Jessenia Mckeon along with you.    Sincerely,    Kevan Rios MD    Enclosure  CC:  No Recipients    If you would like to receive this communication electronically, please contact [email or web address] or [phone] to request more information on [facility] access.    For providers and/or their staff who would like to refer a patient to [facility], please contact us through our one-stop-shop provider referral line, [facility], at [phone].    If you feel you have received this communication in error or would no longer like to receive these types of communications, please e-mail [email or web address]

## 2017-09-20 NOTE — PROGRESS NOTES
Subjective:      Patient ID: Jessenia Mckeon is a 88 y.o. female.    Chief Complaint: Pain of the Right Knee    HPI     Jessenia Mckeon is a 88 year old female here with a few months history of right knee pain. The patient is a  retiree. There was not a history of trauma.  The pain is moderate.  The pain is located in the global aspect of the knee. There is is not radiation. There is catching or locking.   The pain is described as achy. The patient has had prior surgery R TKA in 1999. It is aggravated by walking.  It is not alleviated by rest. There is not numbness or tingling of the lower extremity.  There is not back pain.  She  has tried medications or injections. They have not helped.  She does have difficulty getting in or out of a car, getting dressed, or going up or down stairs.  The patient does use an assistive device.  She also sustained a wound to her right calf about two months ago and she has had trouble healing the wound and sustained an infection which was treated with antibiotics.  She has been seeing wound care for this.     Past Medical History:   Diagnosis Date    CHF (congestive heart failure)     Coronary artery disease     Depression     Fractures        Current Outpatient Prescriptions on File Prior to Visit   Medication Sig Dispense Refill    artificial tears (ISOPTO TEARS) 0.5 % ophthalmic solution Place 1 drop into both eyes 3 (three) times daily as needed.      aspirin 81 MG Chew Take 81 mg by mouth once daily.        CALCIUM & MAGNESIUM CARBONATES ORAL Take by mouth.      carvedilol (COREG) 12.5 MG tablet Take 12.5 mg by mouth every evening.       carvedilol (COREG) 3.125 MG tablet Take 3.125 mg by mouth 2 (two) times daily.  6    citalopram (CELEXA) 20 MG tablet Take 20 mg by mouth once daily.      clopidogrel (PLAVIX) 75 mg tablet Take 75 mg by mouth once daily.        coenzyme Q10 100 mg capsule Take 100 mg by mouth once daily.      cyanocobalamin (VITAMIN B-12) 500  MCG tablet Take 500 mcg by mouth once daily.      cyclobenzaprine (FLEXERIL) 10 MG tablet Take 1 tablet (10 mg total) by mouth 3 (three) times daily as needed. 10 tablet 0    diclofenac sodium 1 % Gel Apply 2 g topically 2 (two) times daily. 1 Tube 2    dicyclomine (BENTYL) 10 MG capsule       doxycycline (DORYX) 100 MG EC tablet Take 100 mg by mouth 2 (two) times daily.      estradiol (VIVELLE-DOT) 0.05 mg/24 hr Place 1 patch onto the skin once a week.        expressed human breast milk (EXPRESSED HUMAN BREAST MILK) APPLY 1-2 GRAMS TO AFFECTED AREA 3-4 TIMES DAILY. RUB IN THOROUGHLY  2    fesoterodine (TOVIAZ) 4 mg Tb24 Take 4 mg by mouth once daily.      furosemide (LASIX) 40 MG tablet Take 40 mg by mouth once daily.       multivitamin capsule Take 1 capsule by mouth once daily.      omega-3 acid ethyl esters (LOVAZA) 1 gram capsule Take 2 g by mouth 2 (two) times daily.      polyethylene glycol (GLYCOLAX) 17 gram PwPk Take by mouth.      potassium chloride (KLOR-CON) 10 MEQ TbSR Take 10 mEq by mouth once daily.        spironolactone (ALDACTONE) 25 MG tablet Take 25 mg by mouth once daily.        pantoprazole (PROTONIX) 40 MG tablet Take 1 tablet (40 mg total) by mouth once daily. 30 tablet 11     No current facility-administered medications on file prior to visit.        Past Surgical History:   Procedure Laterality Date    BACK SURGERY      BLADDER SUSPENSION      BREAST SURGERY      CHOLECYSTECTOMY      HIP SURGERY      HYSTERECTOMY      KNEE SURGERY Right     1999    TONSILLECTOMY         History reviewed. No pertinent family history.    Social History     Social History    Marital status:      Spouse name: N/A    Number of children: N/A    Years of education: N/A     Occupational History    Not on file.     Social History Main Topics    Smoking status: Never Smoker    Smokeless tobacco: Never Used    Alcohol use No    Drug use: No    Sexual activity: No     Other Topics  Concern    Not on file     Social History Narrative    No narrative on file         Review of Systems   Constitution: Negative for chills, fever and night sweats.   HENT: Negative for hearing loss.    Eyes: Negative for blurred vision and double vision.   Cardiovascular: Negative for chest pain, claudication and leg swelling.   Respiratory: Negative for shortness of breath.    Endocrine: Negative for polydipsia, polyphagia and polyuria.   Hematologic/Lymphatic: Negative for adenopathy and bleeding problem. Does not bruise/bleed easily.   Skin: Positive for poor wound healing.   Musculoskeletal: Positive for joint pain.   Gastrointestinal: Negative for diarrhea and heartburn.   Genitourinary: Negative for bladder incontinence.   Neurological: Negative for focal weakness, headaches, numbness, paresthesias and sensory change.   Psychiatric/Behavioral: The patient is not nervous/anxious.    Allergic/Immunologic: Negative for persistent infections.         Objective:      Body mass index is 25.58 kg/m².    General    Constitutional: She is oriented to person, place, and time. She appears well-developed and well-nourished.   HENT:   Head: Normocephalic and atraumatic.   Eyes: EOM are normal.   Cardiovascular: Normal rate.    Pulmonary/Chest: Effort normal.   Neurological: She is alert and oriented to person, place, and time.   Psychiatric: She has a normal mood and affect. Her behavior is normal.     General Musculoskeletal Exam   Gait: normal       Right Knee Exam     Inspection   Erythema: absent  Scars: present  Swelling: present  Effusion: effusion  Deformity: deformity  Bruising: absent    Tenderness   The patient is tender to palpation of the medial joint line and lateral joint line.    Range of Motion   Extension: 10   Flexion: 100     Tests   Ligament Examination   Lachman: abnormal - grade II  MCL - Valgus: abnormal - grade I  LCL - Varus: abnormal - grade I  Patella   Passive Patellar Tilt: neutral  Patellar  Grind: positive    Other   Sensation: normal    Comments:  4x4 cm wound right calf.      Left Knee Exam     Inspection   Erythema: absent  Scars: absent  Swelling: absent  Effusion: absent  Deformity: deformity  Bruising: absent    Tenderness   The patient is experiencing no tenderness.         Range of Motion   Extension: 0   Flexion: 130     Tests   Stability Lachman: normal (-1 to 2mm)   MCL - Valgus: normal (0 to 2mm)  LCL - Varus: normal (0 to 2mm)  Patella   Passive Patellar Tilt: neutral    Other   Sensation: normal    Muscle Strength   Right Lower Extremity   Hip Abduction: 5/5   Quadriceps:  5/5   Hamstrin/5   Left Lower Extremity   Hip Abduction: 5/5   Quadriceps:  5/5   Hamstrin/5     Reflexes     Left Side  Quadriceps:  2+    Right Side   Quadriceps:  2+    Vascular Exam     Right Pulses  Dorsalis Pedis:      2+          Left Pulses  Dorsalis Pedis:      2+          Edema  Right Lower Leg: absent  Left Lower Leg: absent      Radiographs taken last month were reviewed by me.  There is a prosthetic replacement of the right knee(s).  The tibia is subluxed posteriorly. Un resurfaced patella        Assessment:       Encounter Diagnoses   Name Primary?    Failed total knee arthroplasty, initial encounter Yes    Unstable right knee     Open wound, lower leg, right, sequela           Plan:       Jessenia was seen today for pain.    Diagnoses and all orders for this visit:    Failed total knee arthroplasty, initial encounter    Unstable right knee    Open wound, lower leg, right, sequela        Options discussed. Due to pain and instability she will need a revision.  Difficulty with weight bearing and she is a fell risk.   The open wound will need to heal before this is done.  I explained this to the patient and her daughter.

## 2017-09-24 ENCOUNTER — HOSPITAL ENCOUNTER (EMERGENCY)
Facility: HOSPITAL | Age: 82
Discharge: HOME | End: 2017-09-24
Attending: EMERGENCY MEDICINE
Payer: MEDICARE

## 2017-09-24 VITALS
RESPIRATION RATE: 16 BRPM | TEMPERATURE: 97 F | HEIGHT: 64 IN | DIASTOLIC BLOOD PRESSURE: 77 MMHG | WEIGHT: 149 LBS | HEART RATE: 76 BPM | SYSTOLIC BLOOD PRESSURE: 148 MMHG | BODY MASS INDEX: 25.44 KG/M2 | OXYGEN SATURATION: 99 %

## 2017-09-24 DIAGNOSIS — S00.03XA SCALP HEMATOMA, INITIAL ENCOUNTER: Primary | ICD-10-CM
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
## 2017-09-24 LAB
ALBUMIN SERPL BCP-MCNC: 3.3 G/DL
ALP SERPL-CCNC: 94 U/L
ALT SERPL W/O P-5'-P-CCNC: 14 U/L
ANION GAP SERPL CALC-SCNC: 7 MMOL/L
AST SERPL-CCNC: 16 U/L
BASOPHILS # BLD AUTO: 0 K/UL
BASOPHILS NFR BLD: 0.3 %
BILIRUB SERPL-MCNC: 0.3 MG/DL
BILIRUB UR QL STRIP: NEGATIVE
BNP SERPL-MCNC: 638 PG/ML
BUN SERPL-MCNC: 13 MG/DL
CALCIUM SERPL-MCNC: 9.4 MG/DL
CHLORIDE SERPL-SCNC: 94 MMOL/L
CLARITY UR: CLEAR
CO2 SERPL-SCNC: 28 MMOL/L
COLOR UR: YELLOW
CREAT SERPL-MCNC: 0.7 MG/DL
DIFFERENTIAL METHOD: ABNORMAL
EOSINOPHIL # BLD AUTO: 0.1 K/UL
EOSINOPHIL NFR BLD: 1.1 %
ERYTHROCYTE [DISTWIDTH] IN BLOOD BY AUTOMATED COUNT: 13.2 %
EST. GFR  (AFRICAN AMERICAN): >60 ML/MIN/1.73 M^2
EST. GFR  (NON AFRICAN AMERICAN): >60 ML/MIN/1.73 M^2
GLUCOSE SERPL-MCNC: 109 MG/DL
GLUCOSE UR QL STRIP: NEGATIVE
HCT VFR BLD AUTO: 34.6 %
HGB BLD-MCNC: 11.7 G/DL
HGB UR QL STRIP: NEGATIVE
INR PPP: 1
KETONES UR QL STRIP: NEGATIVE
LEUKOCYTE ESTERASE UR QL STRIP: NEGATIVE
LYMPHOCYTES # BLD AUTO: 1.3 K/UL
LYMPHOCYTES NFR BLD: 15.3 %
MCH RBC QN AUTO: 33.1 PG
MCHC RBC AUTO-ENTMCNC: 33.7 G/DL
MCV RBC AUTO: 98 FL
MONOCYTES # BLD AUTO: 0.8 K/UL
MONOCYTES NFR BLD: 9.8 %
NEUTROPHILS # BLD AUTO: 6 K/UL
NEUTROPHILS NFR BLD: 73.5 %
NITRITE UR QL STRIP: NEGATIVE
PH UR STRIP: 7 [PH] (ref 5–8)
PLATELET # BLD AUTO: 282 K/UL
PMV BLD AUTO: 6.6 FL
POTASSIUM SERPL-SCNC: 5.3 MMOL/L
PROT SERPL-MCNC: 6.3 G/DL
PROT UR QL STRIP: NEGATIVE
PROTHROMBIN TIME: 10.4 SEC
RBC # BLD AUTO: 3.53 M/UL
SODIUM SERPL-SCNC: 129 MMOL/L
SP GR UR STRIP: <=1.005 (ref 1–1.03)
TROPONIN I SERPL DL<=0.01 NG/ML-MCNC: 0.01 NG/ML
URN SPEC COLLECT METH UR: ABNORMAL
UROBILINOGEN UR STRIP-ACNC: NEGATIVE EU/DL
WBC # BLD AUTO: 8.2 K/UL

## 2017-09-24 PROCEDURE — 93010 ELECTROCARDIOGRAM REPORT: CPT | Mod: ,,, | Performed by: INTERNAL MEDICINE

## 2017-09-24 PROCEDURE — 84484 ASSAY OF TROPONIN QUANT: CPT

## 2017-09-24 PROCEDURE — 83880 ASSAY OF NATRIURETIC PEPTIDE: CPT

## 2017-09-24 PROCEDURE — 81003 URINALYSIS AUTO W/O SCOPE: CPT

## 2017-09-24 PROCEDURE — 80053 COMPREHEN METABOLIC PANEL: CPT

## 2017-09-24 PROCEDURE — 85610 PROTHROMBIN TIME: CPT

## 2017-09-24 PROCEDURE — 36415 COLL VENOUS BLD VENIPUNCTURE: CPT

## 2017-09-24 PROCEDURE — 99285 EMERGENCY DEPT VISIT HI MDM: CPT

## 2017-09-24 PROCEDURE — 93005 ELECTROCARDIOGRAM TRACING: CPT

## 2017-09-24 PROCEDURE — 85025 COMPLETE CBC W/AUTO DIFF WBC: CPT

## 2017-09-24 PROCEDURE — 25000003 PHARM REV CODE 250: Performed by: EMERGENCY MEDICINE

## 2017-09-24 RX ORDER — ACETAMINOPHEN 500 MG
1000 TABLET ORAL
Status: COMPLETED | OUTPATIENT
Start: 2017-09-24 | End: 2017-09-24

## 2017-09-24 RX ADMIN — ACETAMINOPHEN 1000 MG: 500 TABLET, FILM COATED ORAL at 04:09

## 2017-09-24 NOTE — ED NOTES
Patient and family have been updated on plan of care and results. No needs or questions at this time.

## 2017-09-24 NOTE — ED NOTES
Presents to the ER with c/o fall that occurred just prior to arrival when patient was attempting to use the restroom and fell backwards hitting her head. Patient denies any LOC, moves all extremities without difficulty. Patient ambulates with walker at home, uses a wheel chair when she is away from home. AAOx4. Mucous membranes are pink and moist. Skin is warm, dry and intact. Lungs are clear bilaterally, respirations are regular and unlabored. Denies cough, congestion, rhinorrhea or SOB. BS active x4, no tenderness with palpation, abd is soft and not distended. Denies any appetite or activity change. S1S2, capillary refill is < 2 seconds. Denies dysuria, difficulty urinating, frequency, numbness, tingling or weakness. HANNAH ROMEO

## 2017-09-24 NOTE — ED PROVIDER NOTES
Encounter Date: 9/24/2017    SCRIBE #1 NOTE: Kerline DE LA TORRE, new scribing for, and in the presence of, Dr. Keenan.       History     Chief Complaint   Patient presents with    Fall     hit head on floor / loc        09/24/2017 2:54 PM     Chief complaint: Fall      Jessenia Mckeon is a 88 y.o. female with a history of CHF and CAD who presents to the ED with complaints of head pain secondary to fall that occurred two hours ago. Patient states she was reaching for her walker after using the restroom and simultaneously fell causing her to hit the back of her head. Patient denies LOC and chest pain prior to fall. Prior to this fall, patient has not fallen in the past 3-4 months. Per daughter, patient moved in with her 3 weeks ago and assists patient with transportation via wheelchair, but only uses walker while at home. Per daughter, the patient does have foul-smelling odor that was noticed last week. Patient reports of getting an ECHO performed every year by her PCP, Dr. Harper. Last ECHO was in September 2016. Patient denies continuation of Lasix medication and potassium. Patient has scheduled appointment with PCP on 9/27/2017. Patient has no known drug allergies on file.       The history is provided by the patient and a relative (daughter).     Review of patient's allergies indicates:  No Known Allergies  Past Medical History:   Diagnosis Date    CHF (congestive heart failure)     Coronary artery disease     Depression     Fractures      Past Surgical History:   Procedure Laterality Date    BACK SURGERY      BLADDER SUSPENSION      BREAST SURGERY      CHOLECYSTECTOMY      HIP SURGERY      HYSTERECTOMY      KNEE SURGERY Right     1999    TONSILLECTOMY       History reviewed. No pertinent family history.  Social History   Substance Use Topics    Smoking status: Never Smoker    Smokeless tobacco: Never Used    Alcohol use No     Review of Systems   Constitutional: Negative for fatigue and fever.    HENT: Negative for sore throat.    Respiratory: Negative for cough, chest tightness, shortness of breath and wheezing.    Cardiovascular: Negative for chest pain and palpitations.   Gastrointestinal: Negative for abdominal distention, abdominal pain, diarrhea, nausea and vomiting.   Genitourinary: Negative for dysuria and hematuria.   Musculoskeletal: Positive for myalgias (head). Negative for back pain and neck pain.   Skin: Negative for rash.   Neurological: Negative for dizziness, seizures, syncope, weakness, light-headedness and headaches.   Hematological: Does not bruise/bleed easily.       Physical Exam     Initial Vitals [09/24/17 1338]   BP Pulse Resp Temp SpO2   (!) 152/65 61 16 97.2 °F (36.2 °C) 97 %      MAP       94         Physical Exam    Constitutional: She appears well-developed and well-nourished.  Non-toxic appearance. No distress.   HENT:   Head: Normocephalic and atraumatic. Head is without laceration.   Scalp hematoma on occipital region.    Eyes: EOM are normal. Pupils are equal, round, and reactive to light.   Neck: Normal range of motion. Neck supple. No neck rigidity. No JVD present.   Cardiovascular: Normal rate, regular rhythm and intact distal pulses.   Murmur heard.   Systolic murmur is present   Pulses:       Radial pulses are 2+ on the right side, and 2+ on the left side.        Dorsalis pedis pulses are 2+ on the right side, and 2+ on the left side.        Posterior tibial pulses are 2+ on the right side, and 2+ on the left side.   Abdominal: Soft. Bowel sounds are normal. She exhibits no distension. There is no tenderness. There is no rigidity, no rebound and no guarding.   Musculoskeletal: Normal range of motion.   Neurological: She is alert and oriented to person, place, and time. She has normal strength and normal reflexes. No cranial nerve deficit or sensory deficit. She exhibits normal muscle tone. Coordination normal. GCS eye subscore is 4. GCS verbal subscore is 5. GCS motor  subscore is 6.   CN II-XII intact. 5/5 sensation and strength in upper and lower extremities.    Skin: Skin is warm and dry. Capillary refill takes less than 2 seconds.   Psychiatric: She has a normal mood and affect. Her speech is normal and behavior is normal. She is not actively hallucinating.         ED Course   Procedures  Labs Reviewed   CBC W/ AUTO DIFFERENTIAL - Abnormal; Notable for the following:        Result Value    RBC 3.53 (*)     Hemoglobin 11.7 (*)     Hematocrit 34.6 (*)     MCH 33.1 (*)     MPV 6.6 (*)     Gran% 73.5 (*)     Lymph% 15.3 (*)     All other components within normal limits   COMPREHENSIVE METABOLIC PANEL - Abnormal; Notable for the following:     Sodium 129 (*)     Potassium 5.3 (*)     Chloride 94 (*)     Albumin 3.3 (*)     Anion Gap 7 (*)     All other components within normal limits   B-TYPE NATRIURETIC PEPTIDE - Abnormal; Notable for the following:      (*)     All other components within normal limits   URINALYSIS - Abnormal; Notable for the following:     Specific Gravity, UA <=1.005 (*)     All other components within normal limits   TROPONIN I   PROTIME-INR     EKG Readings: (Independently Interpreted)   Initial Reading: No STEMI.   Sinus rhythm, 61 bpm, short SC 92.  Right bundle branch block.  Normal ST segments, normal T waves.  Anteroseptal infarct.       X-Rays:   Independently Interpreted Readings:   Chest X-Ray: No acute abnormalities.   Head CT: No skull fracture.  No hemorrhage. 2 cm mass in posterior fossa.     Medical Decision Making:   History:   Old Medical Records: I decided to obtain old medical records.  Initial Assessment:   Patient is an 88-year-old woman who presents emergency department for evaluation of fall with head trauma.  She was in the bathroom and uses her walker to ambulate since she is a fall risk.  She got up from the toilet and was washing her hands, when she began to transfer back to her walker she fell down.  She states she did not  lose consciousness but is unsure what happened.  Denies any preceding chest pain, palpitations or headache.  He complains only of pain on the back of her head where she has a scalp hematoma on exam.  CT head shows no evidence of acute fracture or intracranial hemorrhage.  No acute stroke.  There is incidental findings of a 2.5 cm mass in the posterior fossa on the right containing calcification and most likely suggestive of a meningioma.  Family and patient informed of this and for need to follow-up with her primary care physician for MRI or follow-up CT with contrast.  Laboratory evaluation was significant for mild hyponatremia and hypochloremia with sodium 129 and chloride of 94.  Potassium was also mildly elevated at 5.3.  She states she is not taking Lasix or potassium supplementation.  No evidence of any significant history of present illness to explain this.  She is to follow-up with her primary care physician on Wednesday and have repeat blood work.  EKG showed no acute ischemic changes.  I doubt ACS.  Troponin was unremarkable.  I have low suspicion that she had suffered from a cardiac event or seizure.  She is back to baseline.  She is discharged improved in no acute distress.  Return precautions were discussed.  Clinical Tests:   Lab Tests: Reviewed and Ordered  Radiological Study: Reviewed and Ordered  Medical Tests: Reviewed and Ordered            Scribe Attestation:   Scribe #1: I performed the above scribed service and the documentation accurately describes the services I performed. I attest to the accuracy of the note.    Attending Attestation:           Physician Attestation for Scribe:  Physician Attestation Statement for Scribe #1: I, Dr. Keenan, reviewed documentation, as scribed by Kerline Hylton in my presence, and it is both accurate and complete.                 ED Course      Clinical Impression:   The primary encounter diagnosis was Scalp hematoma, initial encounter. A diagnosis of Fall, initial  encounter was also pertinent to this visit.                           Karan Keenan MD  09/24/17 1931

## 2017-09-24 NOTE — ED NOTES
Upon discharge, patient is AAOx4, no cardiac or respiratory complications. Follow up care reviewed with patient and has been instructed to return to the ER if needed. Patient verbalized understanding and ambulated to the lobby without difficulty. OUSMANE YOUNG

## 2017-09-26 ENCOUNTER — TELEPHONE (OUTPATIENT)
Dept: ORTHOPEDICS | Facility: CLINIC | Age: 82
End: 2017-09-26

## 2017-09-26 NOTE — TELEPHONE ENCOUNTER
Pt daughter called and stated the pt saw Dr. Gabriel merida tka revision, however they would like to know if you think trying a nerve block with Dr. Arnold for the right knee is a possibility before going ahead with the surgery.    Please advise. Thanks!

## 2017-09-26 NOTE — TELEPHONE ENCOUNTER
----- Message from Osbaldo Naveen sent at 9/26/2017 10:54 AM CDT -----  Contact: Daughter - Anna Julian  States that she is calling in for the the patient in regards to her right knee and they saw a specialist to do a knee replacement.  Are there any other options.  Can you please call her back at [phone].  Thank you

## 2017-09-28 RX ORDER — DICLOFENAC SODIUM 10 MG/G
2 GEL TOPICAL 2 TIMES DAILY
Qty: 1 TUBE | Refills: 2 | Status: SHIPPED | OUTPATIENT
Start: 2017-09-28 | End: 2019-02-20 | Stop reason: SDUPTHER

## 2017-09-28 NOTE — TELEPHONE ENCOUNTER
----- Message from Radha Baumann sent at 9/28/2017 10:31 AM CDT -----  Contact: Daughter Anna  Patient is out of her pain cream and requesting refill.  Please call [phone] (home).  Thank you    [employer]  [place], [place]  [phone]

## 2017-10-20 ENCOUNTER — OFFICE VISIT (OUTPATIENT)
Dept: PAIN MEDICINE | Facility: CLINIC | Age: 82
End: 2017-10-20
Payer: MEDICARE

## 2017-10-20 VITALS
SYSTOLIC BLOOD PRESSURE: 115 MMHG | DIASTOLIC BLOOD PRESSURE: 55 MMHG | HEIGHT: 64 IN | BODY MASS INDEX: 25.44 KG/M2 | HEART RATE: 61 BPM | WEIGHT: 149 LBS

## 2017-10-20 DIAGNOSIS — G89.29 CHRONIC PAIN OF RIGHT KNEE: Primary | ICD-10-CM

## 2017-10-20 DIAGNOSIS — G89.4 CHRONIC PAIN DISORDER: ICD-10-CM

## 2017-10-20 DIAGNOSIS — M25.50 ARTHRALGIA, UNSPECIFIED JOINT: ICD-10-CM

## 2017-10-20 DIAGNOSIS — M25.561 CHRONIC PAIN OF RIGHT KNEE: Primary | ICD-10-CM

## 2017-10-20 PROCEDURE — 99214 OFFICE O/P EST MOD 30 MIN: CPT | Mod: S$GLB,,, | Performed by: ANESTHESIOLOGY

## 2017-10-20 PROCEDURE — 99999 PR PBB SHADOW E&M-EST. PATIENT-LVL IV: CPT | Mod: PBBFAC,,, | Performed by: ANESTHESIOLOGY

## 2017-10-20 RX ORDER — COLLAGENASE SANTYL 250 [ARB'U]/G
OINTMENT TOPICAL
Refills: 2 | COMMUNITY
Start: 2017-09-21

## 2017-10-20 RX ORDER — MUPIROCIN 20 MG/G
OINTMENT TOPICAL
Refills: 0 | Status: ON HOLD | COMMUNITY
Start: 2017-07-19 | End: 2017-11-06

## 2017-10-20 RX ORDER — CITALOPRAM 40 MG/1
TABLET, FILM COATED ORAL
COMMUNITY
Start: 2017-08-28

## 2017-10-20 RX ORDER — DOXYCYCLINE HYCLATE 100 MG
TABLET ORAL
Status: ON HOLD | COMMUNITY
Start: 2017-10-03 | End: 2017-11-06 | Stop reason: HOSPADM

## 2017-10-20 RX ORDER — NITROGLYCERIN 0.4 MG/1
TABLET SUBLINGUAL
COMMUNITY
Start: 2017-10-09

## 2017-10-20 RX ORDER — TRAMADOL HYDROCHLORIDE 50 MG/1
TABLET ORAL
COMMUNITY
Start: 2017-10-10

## 2017-10-20 RX ORDER — DOXYCYCLINE 100 MG/1
100 CAPSULE ORAL 2 TIMES DAILY
Refills: 0 | Status: ON HOLD | COMMUNITY
Start: 2017-07-19 | End: 2017-11-06

## 2017-10-20 RX ORDER — TRIAMCINOLONE ACETONIDE 1 MG/G
CREAM TOPICAL 2 TIMES DAILY
Refills: 3 | Status: ON HOLD | COMMUNITY
Start: 2017-08-15 | End: 2017-11-06

## 2017-10-20 NOTE — PROGRESS NOTES
This note was completed with dictation software and grammatical errors may exist.    Referring Physician: No ref. provider found    PCP: Flaco Harper MD      CC: right knee pain    HPI:   Patient is 88 y.o.  female accompanied by her daughter today.  She was last seen in May 2017 for chronic left shoulder pain.  She presents today with chronic right knee pain.  She has history of right knee replacement in 1999.  Pain has gradually worsened over past 3 years.  Pain worsens with standing, walking, getting up.  His constant aching, throbbing pain over her right medial knee.  She has been evaluated by orthopedics.  She is currently not a candidate for right knee revision due to her lower extremity wound.  She is currently undergoing care and is scheduled for possible vascular evaluation of her right lower extremity.  She denies any weakness.  No bowel bladder changes.   ROS:  CONSTITUTIONAL: No fevers, chills, night sweats, wt. loss, appetite changes  SKIN: no rashes or itching  ENT: No headaches, head trauma, vision changes, or eye pain  LYMPH NODES: None noticed   CV: No chest pain, palpitations.   RESP: No shortness of breath, dyspnea on exertion, cough, wheezing, or hemoptysis  GI: No nausea, emesis, diarrhea, constipation, melena, hematochezia, pain.    : No dysuria, hematuria, urgency, or frequency   HEME: No easy bruising, bleeding problems  PSYCHIATRIC: No depression, anxiety, psychosis, hallucinations.  NEURO: No seizures, memory loss, dizziness or difficulty sleeping  MSK: + History of present illness      Past Medical History:   Diagnosis Date    CHF (congestive heart failure)     Coronary artery disease     Depression     Fractures      Past Surgical History:   Procedure Laterality Date    BACK SURGERY      BLADDER SUSPENSION      BREAST SURGERY      CHOLECYSTECTOMY      HIP SURGERY      HYSTERECTOMY      KNEE SURGERY Right     1999    TONSILLECTOMY       History reviewed. No pertinent  "family history.  Social History     Social History    Marital status:      Spouse name: N/A    Number of children: N/A    Years of education: N/A     Social History Main Topics    Smoking status: Never Smoker    Smokeless tobacco: Never Used    Alcohol use No    Drug use: No    Sexual activity: No     Other Topics Concern    None     Social History Narrative    None         Medications/Allergies: See med card    Vitals:    10/20/17 1111   BP: (!) 115/55   Pulse: 61   Weight: 67.6 kg (149 lb)   Height: 5' 4" (1.626 m)   PainSc: 10-Worst pain ever   PainLoc: Knee         Physical exam:    GENERAL: A and O x3, the patient appears well groomed and is in no acute distress.  Skin: No rashes or obvious lesions  HEENT: normocephalic, atraumatic  CARDIOVASCULAR:  Palpable peripheral pulses  LUNGS: easy work of breathing  ABDOMEN: soft, nontender   UPPER EXTREMITIES: Decrease ROM of left shoulder. +impingement.  Mild tenderness.   LOWER EXTREMITIES:  Normal alignment, normal range of motion, no atrophy, no skin changes,  hair growth and nail growth normal and equal bilaterally. No swelling, no tenderness. + Right lower extremity wound in bandage  CERVICAL SPINE:  Cervical spine: ROM is full in flexion, extension and lateral rotation with mild ncreased pain.  Spurling's maneuver causes no neck pain to either side.  Myofascial exam: No Tenderness to palpation across cervical paraspinous region bilaterally.    MENTAL STATUS: normal orientation, speech, language, and fund of knowledge for social situation.  Emotional state appropriate.    CRANIAL NERVES:  II:  PERRL bilaterally,   III,IV,VI: EOMI.    V:  Facial sensation equal bilaterally  VII:  Facial motor function normal.  VIII:  Hearing equal to finger rub bilaterally  IX/X: Gag normal, palate symmetric  XI:  Shoulder shrug equal, head turn equal  XII:  Tongue midline without fasciculations      MOTOR: Tone and bulk: normal bilateral upper and lower " Strength: normal   Delt Bi Tri WE WF     R 5 5 5 5 5 5   L 5 5 5 5 5 5     IP ADD ABD Quad TA Gas HAM  R 5 5 5 5 5 5 5  L 5 5 5 5 5 5 5    SENSATION: Light touch and pinprick intact bilaterally  REFLEXES: normal, symmetric, nonbrisk.  Toes down, no clonus. No hoffmans.  GAIT: uses walker for assistance    Imaging:  Xray Shoulder, Left 3/2015    Moderate glenohumeral joint DJD.  Degenerative changes at the AC joint.  Probable rotator cuff pathology.      XraY right knee 9/2017    Right TKA.  No acute fracture or dislocation.      Assessment:  Ms. Mckeon is referred for right knee pain  1. Chronic pain of right knee    2. Arthralgia, unspecified joint    3. Chronic pain disorder          Plan:  - I have stressed the importance of physical activity and exercise to improve overall health  - Patient was interested in right knee peripheral nerve blocks to help with her right knee pain.  If diagnostic for relief, will proceed with radial free see ablation.  - Follow-up after procedure

## 2017-10-30 DIAGNOSIS — G89.29 CHRONIC PAIN OF RIGHT KNEE: Primary | ICD-10-CM

## 2017-10-30 DIAGNOSIS — M25.561 CHRONIC PAIN OF RIGHT KNEE: Primary | ICD-10-CM

## 2017-11-06 ENCOUNTER — SURGERY (OUTPATIENT)
Age: 82
End: 2017-11-06

## 2017-11-06 ENCOUNTER — HOSPITAL ENCOUNTER (OUTPATIENT)
Facility: AMBULARY SURGERY CENTER | Age: 82
Discharge: HOME | End: 2017-11-06
Attending: ANESTHESIOLOGY | Admitting: ANESTHESIOLOGY
Payer: MEDICARE

## 2017-11-06 DIAGNOSIS — M25.569 KNEE PAIN, UNSPECIFIED CHRONICITY, UNSPECIFIED LATERALITY: Primary | ICD-10-CM

## 2017-11-06 DIAGNOSIS — M25.569 KNEE PAIN: ICD-10-CM

## 2017-11-06 PROCEDURE — 99152 MOD SED SAME PHYS/QHP 5/>YRS: CPT | Mod: ,,, | Performed by: ANESTHESIOLOGY

## 2017-11-06 PROCEDURE — 64450 NJX AA&/STRD OTHER PN/BRANCH: CPT | Performed by: ANESTHESIOLOGY

## 2017-11-06 PROCEDURE — 77002 NEEDLE LOCALIZATION BY XRAY: CPT | Performed by: ANESTHESIOLOGY

## 2017-11-06 PROCEDURE — 64450 NJX AA&/STRD OTHER PN/BRANCH: CPT | Mod: RT,,, | Performed by: ANESTHESIOLOGY

## 2017-11-06 RX ORDER — LIDOCAINE HYDROCHLORIDE 20 MG/ML
INJECTION, SOLUTION EPIDURAL; INFILTRATION; INTRACAUDAL; PERINEURAL
Status: DISPENSED
Start: 2017-11-06 | End: 2017-11-06

## 2017-11-06 RX ORDER — MIDAZOLAM HYDROCHLORIDE 1 MG/ML
INJECTION INTRAMUSCULAR; INTRAVENOUS
Status: DISCONTINUED
Start: 2017-11-06 | End: 2017-11-06 | Stop reason: HOSPADM

## 2017-11-06 RX ORDER — MIDAZOLAM HYDROCHLORIDE 1 MG/ML
INJECTION INTRAMUSCULAR; INTRAVENOUS
Status: DISCONTINUED | OUTPATIENT
Start: 2017-11-06 | End: 2017-11-06 | Stop reason: HOSPADM

## 2017-11-06 RX ORDER — ALPRAZOLAM 0.25 MG/1
1 TABLET ORAL ONCE AS NEEDED
Status: DISCONTINUED | OUTPATIENT
Start: 2017-11-06 | End: 2017-11-06 | Stop reason: HOSPADM

## 2017-11-06 RX ORDER — SODIUM CHLORIDE, SODIUM LACTATE, POTASSIUM CHLORIDE, CALCIUM CHLORIDE 600; 310; 30; 20 MG/100ML; MG/100ML; MG/100ML; MG/100ML
INJECTION, SOLUTION INTRAVENOUS ONCE AS NEEDED
Status: COMPLETED | OUTPATIENT
Start: 2017-11-06 | End: 2017-11-06

## 2017-11-06 RX ORDER — LIDOCAINE HYDROCHLORIDE 20 MG/ML
INJECTION, SOLUTION EPIDURAL; INFILTRATION; INTRACAUDAL; PERINEURAL
Status: DISCONTINUED | OUTPATIENT
Start: 2017-11-06 | End: 2017-11-06 | Stop reason: HOSPADM

## 2017-11-06 RX ADMIN — MIDAZOLAM HYDROCHLORIDE 1 MG: 1 INJECTION INTRAMUSCULAR; INTRAVENOUS at 12:11

## 2017-11-06 RX ADMIN — SODIUM CHLORIDE, SODIUM LACTATE, POTASSIUM CHLORIDE, CALCIUM CHLORIDE: 600; 310; 30; 20 INJECTION, SOLUTION INTRAVENOUS at 11:11

## 2017-11-06 RX ADMIN — LIDOCAINE HYDROCHLORIDE 6 ML: 20 INJECTION, SOLUTION EPIDURAL; INFILTRATION; INTRACAUDAL; PERINEURAL at 12:11

## 2017-11-06 NOTE — INTERVAL H&P NOTE
The patient has been examined and the H&P has been reviewed:    I concur with the findings and no changes have occurred since H&P was written.   This patient has been cleared for surgery in an ambulatory surgical facility    ASA 3,  MP3  No history of anesthetic complications  Plan for RN IV sedation      Anesthesia/Surgery risks, benefits and alternative options discussed and understood by patient/family.          Active Hospital Problems    Diagnosis  POA    Knee pain [M25.569]  Yes      Resolved Hospital Problems    Diagnosis Date Resolved POA   No resolved problems to display.

## 2017-11-06 NOTE — OP NOTE
PROCEDURE DATE: 11/6/2017    PROCEDURE: Superior lateral, Superior medial and Inferior medial knee peripheral nerve blocks, right-sided    Diagnosis: Right knee pain     Post Op Diagnosis: Same     PHYSICIAN: Jeovanny Arnold M.D.     LOCAL ANESTHESIA: Lidocaine 1%, 3 ml total.     MEDICATION INJECTED: 2% Lidocaine 2ml at each nerve     SEDATION MEDICATIONS: IV Versed    COMPLICATIONS: None     ESTIMATED BLOOD LOSS: None     TECHNIQUE: A time-out was taken to identify patient and procedure side prior to starting procedure.   With the patient laying supine and the knees semi-flexed, the right knee was prepped and draped in the usual sterile fashion using ChloraPrep and a fenestrated drape. Knee joint line was determined under fluoroscopic guidance. The targets included the superior lateral (SL), superior medial (SM) and inferior medial (IM) genicular nerves which past periosteal areas connecting the shaft of the femur to bilateral epicondyles and the shaft of tibia to the medial epicondyle. The local anesthetic was given using a 25-gauge 1.5 inch needle. 3.5in 25g spinal needed was introduced into each target area. 0.5ml contrast was injected to confirm placement and to rule out intravascular uptake. After negative aspiration for blood, the medication was then injected. The patient tolerated the procedure well.     If found to have greater than a 50% recovery and so will be scheduled for a radiofrequency ablation of the corresponding nerves.     Patient was given post procedure and discharge instructions to follow at home. The patient was discharged in a stable condition

## 2017-11-06 NOTE — DISCHARGE INSTRUCTIONS
Nerve Block  This was a test, not a treatment. Your pain may return.  Keep your pain journal Dr office will call to check your pain levels within 3 days    Home care instructions  You may apply ice pack to the injection site for 2 days , NO HEAT for 2 days  You may take a shower but no soaking above level of injection in tub or pool for 2 days  Resume Aspirin, Plavix, or Coumadin the day after the procedure unless otherwise instructed.  Gradually increase activity  Do not drive for 24 hr  If diabetic monitor your glucose carefully. Follow up with your primary MD if this is a problem    SEEK  IMMEDIATE MEDICAL HELP FOR:  Severe increase in your usual pain or appearance of new pain  Prolonged or increasing weakness or numbness in the legs or arms  Drainage, redness, active bleeding, or increased swelling at the injection site  Temperature over 100.0 degrees F.  Headache that increases when your head is upright and decreases when you lie flat  Shortness of breath, chest pain, or problems breathing     No heat at injection sites x 2 days. No heating pads for 2 days.  Use ice for mild swelling and for comfort.  May shower today. No tub baths for 2 days. No Hot tubs or swimming for 2 days.  Recovery After Procedural Sedation (Adult)  Resume Aspirin, Plavix, or Coumadin the day after the procedure unless otherwise instructed.   If diabetic,monitor your glucose carefully as steroids can increase glucose level  Severe increase in your usual pain or appearance of new pain.  Prolonged or increasing weakness or numbness in the legs or arms.    - Numbing medicine was injected that affects nerves that carry information from     your  muscles to brain and  the brain to the muscles.  This numbness can last 4-6 hrs so be very careful to prevent falls. Get help standing or walking.    Fever above 101 ,Drainage,redness,active bleeding, or increased swelling at the injection site.  Headache, shortness of breath, chest pain, or  breathing problems.      Seek immediate medical help for:   You have been given medicine by vein to make you sleep during your surgery. This may have included both a pain medicine and sleeping medicine. Most of the effects have worn off. But you may still have some drowsiness for the next 6 to 8 hours.  Home care  Follow these guidelines when you get home:  · For the next 8 hours, you should be watched by a responsible adult. This person should make sure your condition is not getting worse.  · Don't drink any alcohol for the next 24 hours or when taking narcotic medication  · Don't drive, operate dangerous machinery, or make important business or personal decisions during the next 24 hours.  Note: Your healthcare provider may tell you not to take any medicine by mouth for pain or sleep in the next 4 hours. These medicines may react with the medicines you were given in the hospital. This could cause a much stronger response than usual.  Follow-up care  Follow up with your healthcare provider if you are not alert and back to your usual level of activity within 12 hours.  When to seek medical advice  Call your healthcare provider right away if any of these occur:  · Drowsiness gets worse  · Weakness or dizziness gets worse  · Repeated vomiting  · You can't be awakened   Date Last Reviewed: 10/18/2016  © 3483-9242 [employer]. [address] [place] [address], [place] [address]. All rights reserved. This information is not intended as a substitute for professional medical care. Always follow your healthcare professional's instructions.

## 2017-11-06 NOTE — DISCHARGE SUMMARY
[facility]  Discharge Note  Short Stay    Admit Date: 11/6/2017    Discharge Date and Time: 11/6/2017    Attending Physician: Jeovanny Arnold MD     Discharge Provider: Jeovanny Arnold    Diagnoses:  Active Hospital Problems    Diagnosis  POA    *Knee pain [M25.569]  Yes      Resolved Hospital Problems    Diagnosis Date Resolved POA   No resolved problems to display.       Hospital Course: Knee Nerve blocks  Discharged Condition: Good    Final Diagnoses:   Active Hospital Problems    Diagnosis  POA    *Knee pain [M25.569]  Yes      Resolved Hospital Problems    Diagnosis Date Resolved POA   No resolved problems to display.       Disposition: Home or Self Care    Follow up/Patient Instructions:    Medications:  Reconciled Home Medications:   Current Discharge Medication List      CONTINUE these medications which have NOT CHANGED    Details   carvedilol (COREG) 3.125 MG tablet Take 3.125 mg by mouth 2 (two) times daily.  Refills: 6      citalopram (CELEXA) 40 MG tablet       dicyclomine (BENTYL) 10 MG capsule       artificial tears (ISOPTO TEARS) 0.5 % ophthalmic solution Place 1 drop into both eyes 3 (three) times daily as needed.      aspirin 81 MG Chew Take 81 mg by mouth once daily.        coenzyme Q10 100 mg capsule Take 100 mg by mouth once daily.      cyanocobalamin (VITAMIN B-12) 500 MCG tablet Take 500 mcg by mouth once daily.      diclofenac sodium 1 % Gel Apply 2 g topically 2 (two) times daily.  Qty: 1 Tube, Refills: 2      multivitamin capsule Take 1 capsule by mouth once daily.      nitroGLYCERIN (NITROSTAT) 0.4 MG SL tablet       omega-3 acid ethyl esters (LOVAZA) 1 gram capsule Take 2 g by mouth 2 (two) times daily.      polyethylene glycol (GLYCOLAX) 17 gram PwPk Take by mouth.      potassium chloride (KLOR-CON) 10 MEQ TbSR Take 10 mEq by mouth once daily.        SANTYL ointment APPLY TO AFFECTED AREA DAILY  Refills: 2      tramadol (ULTRAM) 50 mg tablet          STOP taking these medications        CALCIUM & MAGNESIUM CARBONATES ORAL Comments:   Reason for Stopping:         clopidogrel (PLAVIX) 75 mg tablet Comments:   Reason for Stopping:         cyclobenzaprine (FLEXERIL) 10 MG tablet Comments:   Reason for Stopping:         doxycycline (DORYX) 100 MG EC tablet Comments:   Reason for Stopping:         doxycycline (MONODOX) 100 MG capsule Comments:   Reason for Stopping:         doxycycline (VIBRA-TABS) 100 MG tablet Comments:   Reason for Stopping:         estradiol (VIVELLE-DOT) 0.05 mg/24 hr Comments:   Reason for Stopping:         expressed human breast milk (EXPRESSED HUMAN BREAST MILK) Comments:   Reason for Stopping:         fesoterodine (TOVIAZ) 4 mg Tb24 Comments:   Reason for Stopping:         FLUZONE HIGH-DOSE 2017-18, PF, 180 mcg/0.5 mL vaccine Comments:   Reason for Stopping:         furosemide (LASIX) 40 MG tablet Comments:   Reason for Stopping:         mupirocin (BACTROBAN) 2 % ointment Comments:   Reason for Stopping:         pantoprazole (PROTONIX) 40 MG tablet Comments:   Reason for Stopping:         spironolactone (ALDACTONE) 25 MG tablet Comments:   Reason for Stopping:         triamcinolone acetonide 0.1% (KENALOG) 0.1 % cream Comments:   Reason for Stopping:               Discharge Procedure Orders  Diet general     Activity as tolerated     Call MD for:  temperature >100.4     Call MD for:  persistent nausea and vomiting or diarrhea     Call MD for:  severe uncontrolled pain     Call MD for:  redness, tenderness, or signs of infection (pain, swelling, redness, odor or green/yellow discharge around incision site)     Call MD for:  difficulty breathing or increased cough     Call MD for:  severe persistent headache          Follow up with MD in 2-3 weeks    Discharge Procedure Orders (must include Diet, Follow-up, Activity):    Discharge Procedure Orders (must include Diet, Follow-up, Activity)  Diet general     Activity as tolerated     Call MD for:  temperature >100.4     Call MD for:   persistent nausea and vomiting or diarrhea     Call MD for:  severe uncontrolled pain     Call MD for:  redness, tenderness, or signs of infection (pain, swelling, redness, odor or green/yellow discharge around incision site)     Call MD for:  difficulty breathing or increased cough     Call MD for:  severe persistent headache

## 2017-11-06 NOTE — PLAN OF CARE
Patient sitting in wheelchair and states she is ready to go home. Denies pain, nausea or weakness.daughter chairside and states ready to drive patient home.

## 2017-11-07 ENCOUNTER — TELEPHONE (OUTPATIENT)
Dept: PAIN MEDICINE | Facility: CLINIC | Age: 82
End: 2017-11-07

## 2017-11-07 VITALS
RESPIRATION RATE: 18 BRPM | WEIGHT: 149.06 LBS | BODY MASS INDEX: 25.45 KG/M2 | OXYGEN SATURATION: 96 % | HEART RATE: 74 BPM | DIASTOLIC BLOOD PRESSURE: 57 MMHG | SYSTOLIC BLOOD PRESSURE: 123 MMHG | HEIGHT: 64 IN | TEMPERATURE: 98 F

## 2017-11-07 NOTE — TELEPHONE ENCOUNTER
Patient reports 80% or greater decrease in pain following Medial Branch Block. Patient scheduled on 11/21/17 for second Medial Branch Block. Instructions given. Patient accepted and voiced understanding.

## 2017-11-14 DIAGNOSIS — G89.29 CHRONIC PAIN OF RIGHT KNEE: Primary | ICD-10-CM

## 2017-11-14 DIAGNOSIS — M25.561 CHRONIC PAIN OF RIGHT KNEE: Primary | ICD-10-CM

## 2017-11-20 RX ORDER — ROPINIROLE 1 MG/1
1 TABLET, FILM COATED ORAL NIGHTLY
COMMUNITY

## 2017-11-21 ENCOUNTER — SURGERY (OUTPATIENT)
Age: 82
End: 2017-11-21

## 2017-11-21 ENCOUNTER — HOSPITAL ENCOUNTER (OUTPATIENT)
Facility: AMBULARY SURGERY CENTER | Age: 82
Discharge: HOME | End: 2017-11-21
Attending: ANESTHESIOLOGY | Admitting: ANESTHESIOLOGY
Payer: MEDICARE

## 2017-11-21 DIAGNOSIS — M25.569 KNEE PAIN, UNSPECIFIED CHRONICITY, UNSPECIFIED LATERALITY: Primary | ICD-10-CM

## 2017-11-21 DIAGNOSIS — M25.569 KNEE PAIN: ICD-10-CM

## 2017-11-21 PROCEDURE — 77002 NEEDLE LOCALIZATION BY XRAY: CPT | Performed by: ANESTHESIOLOGY

## 2017-11-21 PROCEDURE — 64640 INJECTION TREATMENT OF NERVE: CPT | Mod: RT,,, | Performed by: ANESTHESIOLOGY

## 2017-11-21 PROCEDURE — 64640 INJECTION TREATMENT OF NERVE: CPT | Performed by: ANESTHESIOLOGY

## 2017-11-21 PROCEDURE — 99152 MOD SED SAME PHYS/QHP 5/>YRS: CPT | Mod: ,,, | Performed by: ANESTHESIOLOGY

## 2017-11-21 RX ORDER — DEXAMETHASONE SODIUM PHOSPHATE 10 MG/ML
INJECTION INTRAMUSCULAR; INTRAVENOUS
Status: DISCONTINUED | OUTPATIENT
Start: 2017-11-21 | End: 2017-11-21 | Stop reason: HOSPADM

## 2017-11-21 RX ORDER — LIDOCAINE HYDROCHLORIDE 20 MG/ML
INJECTION, SOLUTION EPIDURAL; INFILTRATION; INTRACAUDAL; PERINEURAL
Status: DISCONTINUED | OUTPATIENT
Start: 2017-11-21 | End: 2017-11-21 | Stop reason: HOSPADM

## 2017-11-21 RX ORDER — FENTANYL CITRATE 50 UG/ML
INJECTION, SOLUTION INTRAMUSCULAR; INTRAVENOUS
Status: DISCONTINUED | OUTPATIENT
Start: 2017-11-21 | End: 2017-11-21 | Stop reason: HOSPADM

## 2017-11-21 RX ORDER — LIDOCAINE HYDROCHLORIDE 10 MG/ML
INJECTION, SOLUTION EPIDURAL; INFILTRATION; INTRACAUDAL; PERINEURAL
Status: DISCONTINUED | OUTPATIENT
Start: 2017-11-21 | End: 2017-11-21 | Stop reason: HOSPADM

## 2017-11-21 RX ORDER — BUPIVACAINE HYDROCHLORIDE 2.5 MG/ML
INJECTION, SOLUTION EPIDURAL; INFILTRATION; INTRACAUDAL
Status: DISCONTINUED | OUTPATIENT
Start: 2017-11-21 | End: 2017-11-21 | Stop reason: HOSPADM

## 2017-11-21 RX ORDER — DEXAMETHASONE SODIUM PHOSPHATE 10 MG/ML
INJECTION INTRAMUSCULAR; INTRAVENOUS
Status: DISCONTINUED
Start: 2017-11-21 | End: 2017-11-21 | Stop reason: HOSPADM

## 2017-11-21 RX ORDER — SODIUM CHLORIDE, SODIUM LACTATE, POTASSIUM CHLORIDE, CALCIUM CHLORIDE 600; 310; 30; 20 MG/100ML; MG/100ML; MG/100ML; MG/100ML
INJECTION, SOLUTION INTRAVENOUS ONCE AS NEEDED
Status: COMPLETED | OUTPATIENT
Start: 2017-11-21 | End: 2017-11-21

## 2017-11-21 RX ORDER — LIDOCAINE HYDROCHLORIDE 20 MG/ML
INJECTION, SOLUTION EPIDURAL; INFILTRATION; INTRACAUDAL; PERINEURAL
Status: DISCONTINUED
Start: 2017-11-21 | End: 2017-11-21 | Stop reason: HOSPADM

## 2017-11-21 RX ORDER — MIDAZOLAM HYDROCHLORIDE 1 MG/ML
INJECTION INTRAMUSCULAR; INTRAVENOUS
Status: DISCONTINUED | OUTPATIENT
Start: 2017-11-21 | End: 2017-11-21 | Stop reason: HOSPADM

## 2017-11-21 RX ORDER — LIDOCAINE HYDROCHLORIDE 10 MG/ML
INJECTION, SOLUTION EPIDURAL; INFILTRATION; INTRACAUDAL; PERINEURAL
Status: DISCONTINUED
Start: 2017-11-21 | End: 2017-11-21 | Stop reason: HOSPADM

## 2017-11-21 RX ORDER — MIDAZOLAM HYDROCHLORIDE 1 MG/ML
INJECTION INTRAMUSCULAR; INTRAVENOUS
Status: DISCONTINUED
Start: 2017-11-21 | End: 2017-11-21 | Stop reason: HOSPADM

## 2017-11-21 RX ADMIN — MIDAZOLAM HYDROCHLORIDE 1 MG: 1 INJECTION INTRAMUSCULAR; INTRAVENOUS at 12:11

## 2017-11-21 RX ADMIN — SODIUM CHLORIDE, SODIUM LACTATE, POTASSIUM CHLORIDE, CALCIUM CHLORIDE: 600; 310; 30; 20 INJECTION, SOLUTION INTRAVENOUS at 11:11

## 2017-11-21 RX ADMIN — LIDOCAINE HYDROCHLORIDE 6 ML: 20 INJECTION, SOLUTION EPIDURAL; INFILTRATION; INTRACAUDAL; PERINEURAL at 12:11

## 2017-11-21 RX ADMIN — FENTANYL CITRATE 50 MCG: 50 INJECTION, SOLUTION INTRAMUSCULAR; INTRAVENOUS at 12:11

## 2017-11-21 RX ADMIN — LIDOCAINE HYDROCHLORIDE 10 ML: 10 INJECTION, SOLUTION EPIDURAL; INFILTRATION; INTRACAUDAL; PERINEURAL at 12:11

## 2017-11-21 RX ADMIN — DEXAMETHASONE SODIUM PHOSPHATE 10 MG: 10 INJECTION INTRAMUSCULAR; INTRAVENOUS at 12:11

## 2017-11-21 RX ADMIN — BUPIVACAINE HYDROCHLORIDE 6 ML: 2.5 INJECTION, SOLUTION EPIDURAL; INFILTRATION; INTRACAUDAL at 12:11

## 2017-11-21 NOTE — OP NOTE
DATE: 11/21/2017    PROCEDURE: Radiofrequency Ablations of Superior lateral, Superior medial and Inferior medial knee peripheral nerves, right-sided     Diagnosis: Right knee pain     Post Op Diagnosis: Same     PHYSICIAN: Jeovanny Arnold M.D.     LOCAL ANESTHESIA: Lidocaine 1%, 3 ml total.     MEDICATION INJECTED: Mixture of 5ml of 0.25% lidocaine and 10mg of dexamethasone     SEDATION MEDICATIONS: RN IV sedation    COMPLICATIONS: None     ESTIMATED BLOOD LOSS: None     TECHNIQUE: A time-out was taken to identify patient and procedure side prior to starting procedure.   With the patient laying supine and the knees semi-flexed, the right knee was prepped and draped in the usual sterile fashion using ChloraPrep and a fenestrated drape. Knee joint line was determined under fluoroscopic guidance. The targets included the superior lateral (SL), superior medial (SM) and inferior medial (IM) genicular nerves which past periosteal areas connecting the shaft of the femur to bilateral epicondyles and the shaft of tibia to the medial epicondyle. The local anesthetic was given using a 25-gauge 1.5 inch needle. 100mm RF needle was introduced into each target area.   Motor stimulation up to 2 Volts at each level confirmed no motor nerve involvement. Impedance was less than 800 ohms at each level.   1ml of 2% lidocaine was instilled prior to lesioning. Ablation was performed per level utilizing radiofrequency generator approximately 80°C for 60 seconds x 3. The above noted medication was then injected slowly. The patient tolerated the procedure well    Patient was given post procedure and discharge instructions to follow at home. The patient was discharged in a stable condition

## 2017-11-21 NOTE — DISCHARGE INSTRUCTIONS
Understanding Radiofrequency Denervation  Radiofrequency denervation is a treatment choice for some kinds of lower back and neck pain. It uses an electrical current created by radio waves. The radio waves make heat that destroys nerves along the spine that are causing pain. Its also known as radiofrequency lesioning, ablation, neurotomy, or rhizomotomy.  How to say it  [id number]-oh-[id number]-joe-see Bharath-kobe   Why radiofrequency denervation is done  This treatment may be an option to reduce or stop lower back or neck pain that has lasted for a month or more. It can help treat pain caused by arthritis, normal wear and tear, disk disease, injury, and other problems. Its used when other treatment hasnt worked, such as medicine and physical therapy. It may be done after an injection of medicine into the nerve area to confirm that the nerve will respond to treatment.  How radiofrequency denervation is done  The procedure is done in a hospital or medical clinic. During the treatment:  · You lie on your stomach. The area in your back or neck to be treated may be numbed. You may be given some medicine to help you relax.  · The healthcare provider inserts a thin tube through the skin over the spine in your lower back or neck. He or she uses moving X-ray machine called a fluoroscope to help make sure the thin tube is in the right place. You may feel some discomfort.  · When the tube is in place, the provider puts a wire through the tube. The wire is connected to a machine that sends radio waves through the wire. The radio waves heat the nerve and destroy it.  · More than one nerve may need to be treated. You can go home 1 to 2 hours after the procedure.   You may feel more pain right after the treatment. The pain should then go away over 1 to 3 weeks. The pain relief may last for less than a month, or for 6 months or more. This depends on what is causing your pain, and how well this treatment can work for it. It may  help you be able to take less medicine for pain. The nerve will likely grow back. But it may not cause pain, or as much pain as before.  Risks of radiofrequency denervation  · More pain after the procedure for a period of time  · Mild burning feeling that may last up to 3 weeks  · Numbness in the area that may last up to 4 months  Date Last Reviewed: 6/1/2016  © 5832-8919 [employer]. [address] [place] [address], [place] [id number]. All rights reserved. This information is not intended as a substitute for professional medical care. Always follow your healthcare professional's instructions.        Recovery After Procedural Sedation (Adult)  You have been given medicine by vein to make you sleep during your surgery. This may have included both a pain medicine and sleeping medicine. Most of the effects have worn off. But you may still have some drowsiness for the next 6 to 8 hours.  Home care  Follow these guidelines when you get home:  · For the next 8 hours, you should be watched by a responsible adult. This person should make sure your condition is not getting worse.  · Don't drink any alcohol for the next 24 hours.  · Don't drive, operate dangerous machinery, or make important business or personal decisions during the next 24 hours.  Note: Your healthcare provider may tell you not to take any medicine by mouth for pain or sleep in the next 4 hours. These medicines may react with the medicines you were given in the hospital. This could cause a much stronger response than usual.  Follow-up care  Follow up with your healthcare provider if you are not alert and back to your usual level of activity within 12 hours.  When to seek medical advice  Call your healthcare provider right away if any of these occur:  · Drowsiness gets worse  · Weakness or dizziness gets worse  · Repeated vomiting  · You can't be awakened   Date Last Reviewed: 10/18/2016  © 7985-3848 [employer]. [address] [place] [address],  [place] [address]. All rights reserved. This information is not intended as a substitute for professional medical care. Always follow your healthcare professional's instructions.      Pain injection instructions:     Steroids take about a week to relieve pain.  Initially you may get pain relief from the local anesthetic but this may wear off before the steroid works.    No driving for 24 hrs or when taking narcotic pain medication.  Activity as tolerated- gradually increase activities.  Dont lift over 10 lbs for 24 hrs   No heat at injection sites x 2 days. No heating pads, hot tubs, swimming pools or saunas for 2 days.  Use covered ice pack for mild swelling and for comfort at 20 minutes on 20 minute off intervals. No contact of ice directly to skin.  May shower today. No tub baths for two days.      Resume Aspirin, Plavix, or Coumadin the day after the procedure unless otherwise instructed.   If diabetic,monitor your glucose carefully as steroids can increase glucose level    Seek immediate medical help for:   Severe increase in your usual pain or appearance of new pain.  Prolonged or increasing weakness or numbness in the legs or arms.    - Numbing medicine was injected that affects nerves that carry information from       muscles to brain and vice versa.  This numbness can last 4-6 hrs so be very careful walking.    Fever above 101 ,Drainage,redness,active bleeding, or increased swelling at the injection site.  Headache, shortness of breath, chest pain, or breathing problems.

## 2017-11-21 NOTE — H&P
"CC: knee pain    HPI: The patient is a 88 y.o. female with a history of knee pain here for knee peripheral nerve block. There are no major changes in history and physical from 10/20/17 by myself.    Past Medical History:   Diagnosis Date    CHF (congestive heart failure)     Coronary artery disease     Depression     Fractures        Past Surgical History:   Procedure Laterality Date    BACK SURGERY      BLADDER SUSPENSION      BREAST SURGERY      CHOLECYSTECTOMY      HIP SURGERY      HYSTERECTOMY      KNEE SURGERY Right     1999    TONSILLECTOMY         History reviewed. No pertinent family history.    Social History     Social History    Marital status:      Spouse name: N/A    Number of children: N/A    Years of education: N/A     Social History Main Topics    Smoking status: Never Smoker    Smokeless tobacco: Never Used    Alcohol use No    Drug use: No    Sexual activity: No     Other Topics Concern    None     Social History Narrative    None       Current Facility-Administered Medications   Medication Dose Route Frequency Provider Last Rate Last Dose    lidocaine  20 mg/mL (2%) 20 mg/mL (2 %) injection             omnipaque 300 iohexol 300 mg iodine/mL                Review of patient's allergies indicates:  No Known Allergies    Vitals:    11/20/17 0813 11/21/17 1138   BP:  (!) 161/73   Pulse:  75   Resp:  18   Temp:  98 °F (36.7 °C)   TempSrc:  Oral   SpO2:  (!) 93%   Weight: 67.6 kg (149 lb)    Height: 5' 4" (1.626 m)        REVIEW OF SYSTEMS:     GENERAL: No weight loss, malaise or fevers.  HEENT:  No recent changes in vision or hearing  NECK: Negative for lumps, no difficulty with swallowing.  RESPIRATORY: Negative for cough, wheezing or shortness of breath, patient denies any recent URI.  CARDIOVASCULAR: Negative for chest pain, leg swelling or palpitations.  GI: Negative for abdominal discomfort, blood in stools or black stools or change in bowel " habits.  MUSCULOSKELETAL: See HPI.  SKIN: Negative for lesions, rash, and itching.  PSYCH: No suicidal or homicidal ideations, no current mood disturbances.  HEMATOLOGY/LYMPHOLOGY: Negative for prolonged bleeding, bruising easily or swollen nodes. Patient is not currently taking any anti-coagulants  ENDO: No history of diabetes or thyroid dysfunction  NEURO: No history of syncope, paralysis, seizures or tremors.All other reviewed and negative other than HPI.    Physical exam:  Gen: A and O x3, pleasant, well-groomed  Skin: No rashes or obvious lesions  HEENT: PERRLA, no obvious deformities on ears or in canals. No thyroid masses, trachea midline, no palpable lymph nodes in neck, axilla.  CVS: Regular rate and rhythm, normal S1 and S2, no murmurs.  Resp: Clear to auscultation bilaterally.  Abdomen: Soft, NT/ND, normal bowel sounds present.  Musculoskeletal/Neuro: Moving all extremities    Assessment:  Knee pain, unspecified chronicity, unspecified laterality  -     Place in Outpatient; Standing  -     Vital signs; Standing  -     Activity as tolerated; Standing  -     Insert peripheral IV; Standing  -     Verify informed consent; Standing  -     Notify physician ; Standing  -     Notify physician ; Standing  -     Notify physician (specify); Standing  -     Diet NPO; Standing    Knee pain    Other orders  -     lactated ringers infusion; Inject into the vein once as needed (IV Sedation).  -     Low Risk of VTE; Standing  -     omnipaque 300 iohexol 300 mg iodine/mL;   -     lidocaine  20 mg/mL (2%) 20 mg/mL (2 %) injection;           PLAN: Knee peripheral nerve block  This patient has been cleared for surgery in an ambulatory surgical facility    ASA 3,  MP3  No history of anesthetic complications  Plan for RN IV sedation

## 2017-11-21 NOTE — DISCHARGE SUMMARY
[facility]  Discharge Note  Short Stay    Admit Date: 11/21/2017    Discharge Date and Time: 11/21/2017    Attending Physician: Jeovanny Arnold MD     Discharge Provider: Jeovanny Arnold    Diagnoses:  Active Hospital Problems    Diagnosis  POA    *Knee pain [M25.569]  Yes      Resolved Hospital Problems    Diagnosis Date Resolved POA   No resolved problems to display.       Hospital Course: Knee Peripheral nerve RFA  Discharged Condition: Good    Final Diagnoses:   Active Hospital Problems    Diagnosis  POA    *Knee pain [M25.569]  Yes      Resolved Hospital Problems    Diagnosis Date Resolved POA   No resolved problems to display.       Disposition: Home or Self Care    Follow up/Patient Instructions:    Medications:  Reconciled Home Medications:   Current Discharge Medication List      CONTINUE these medications which have NOT CHANGED    Details   carvedilol (COREG) 3.125 MG tablet Take 3.125 mg by mouth 2 (two) times daily.  Refills: 6      citalopram (CELEXA) 40 MG tablet       dicyclomine (BENTYL) 10 MG capsule       MAGNESIUM, ALUMINUM HYDROXIDE (MAG-AL ORAL) Take 500 mg by mouth once daily.      rOPINIRole (REQUIP) 1 MG tablet Take 1 mg by mouth every evening.      artificial tears (ISOPTO TEARS) 0.5 % ophthalmic solution Place 1 drop into both eyes 3 (three) times daily as needed.      aspirin 81 MG Chew Take 81 mg by mouth once daily.        coenzyme Q10 100 mg capsule Take 100 mg by mouth once daily.      cyanocobalamin (VITAMIN B-12) 500 MCG tablet Take 500 mcg by mouth once daily.      diclofenac sodium 1 % Gel Apply 2 g topically 2 (two) times daily.  Qty: 1 Tube, Refills: 2      multivitamin capsule Take 1 capsule by mouth once daily.      nitroGLYCERIN (NITROSTAT) 0.4 MG SL tablet       omega-3 acid ethyl esters (LOVAZA) 1 gram capsule Take 2 g by mouth 2 (two) times daily.      polyethylene glycol (GLYCOLAX) 17 gram PwPk Take by mouth.      SANTYL ointment APPLY TO AFFECTED AREA DAILY  Refills: 2       tramadol (ULTRAM) 50 mg tablet              Discharge Procedure Orders  Diet general     Activity as tolerated     Call MD for:  temperature >100.4     Call MD for:  persistent nausea and vomiting or diarrhea     Call MD for:  severe uncontrolled pain     Call MD for:  redness, tenderness, or signs of infection (pain, swelling, redness, odor or green/yellow discharge around incision site)     Call MD for:  difficulty breathing or increased cough     Call MD for:  severe persistent headache          Follow up with MD in 2-3 weeks    Discharge Procedure Orders (must include Diet, Follow-up, Activity):    Discharge Procedure Orders (must include Diet, Follow-up, Activity)  Diet general     Activity as tolerated     Call MD for:  temperature >100.4     Call MD for:  persistent nausea and vomiting or diarrhea     Call MD for:  severe uncontrolled pain     Call MD for:  redness, tenderness, or signs of infection (pain, swelling, redness, odor or green/yellow discharge around incision site)     Call MD for:  difficulty breathing or increased cough     Call MD for:  severe persistent headache

## 2017-11-21 NOTE — PLAN OF CARE
Patient sitting in wheelchair and denies pain nausea or weakness. Just voided without difficulty. Patient states she is ready to go home. Patient's daughter is chairside and states ready to take  And drive patient home.

## 2017-11-27 VITALS
TEMPERATURE: 98 F | SYSTOLIC BLOOD PRESSURE: 131 MMHG | RESPIRATION RATE: 16 BRPM | BODY MASS INDEX: 25.44 KG/M2 | DIASTOLIC BLOOD PRESSURE: 58 MMHG | WEIGHT: 149 LBS | HEIGHT: 64 IN | HEART RATE: 59 BPM | OXYGEN SATURATION: 94 %

## 2017-11-30 ENCOUNTER — OFFICE VISIT (OUTPATIENT)
Dept: ORTHOPEDICS | Facility: CLINIC | Age: 82
End: 2017-11-30
Payer: MEDICARE

## 2017-11-30 VITALS
DIASTOLIC BLOOD PRESSURE: 58 MMHG | SYSTOLIC BLOOD PRESSURE: 124 MMHG | HEIGHT: 64 IN | HEART RATE: 49 BPM | BODY MASS INDEX: 25.44 KG/M2 | WEIGHT: 149 LBS

## 2017-11-30 DIAGNOSIS — M19.049 PRIMARY LOCALIZED OSTEOARTHROSIS OF HAND, UNSPECIFIED LATERALITY: Primary | ICD-10-CM

## 2017-11-30 DIAGNOSIS — G56.03 BILATERAL CARPAL TUNNEL SYNDROME: ICD-10-CM

## 2017-11-30 PROCEDURE — 99999 PR PBB SHADOW E&M-EST. PATIENT-LVL III: CPT | Mod: PBBFAC,,, | Performed by: ORTHOPAEDIC SURGERY

## 2017-11-30 PROCEDURE — 20526 THER INJECTION CARP TUNNEL: CPT | Mod: 50,S$GLB,, | Performed by: ORTHOPAEDIC SURGERY

## 2017-11-30 PROCEDURE — 99213 OFFICE O/P EST LOW 20 MIN: CPT | Mod: 25,S$GLB,, | Performed by: ORTHOPAEDIC SURGERY

## 2017-11-30 RX ADMIN — TRIAMCINOLONE ACETONIDE 40 MG: 40 INJECTION, SUSPENSION INTRA-ARTICULAR; INTRAMUSCULAR at 12:11

## 2017-12-03 RX ORDER — TRIAMCINOLONE ACETONIDE 40 MG/ML
40 INJECTION, SUSPENSION INTRA-ARTICULAR; INTRAMUSCULAR
Status: DISCONTINUED | OUTPATIENT
Start: 2017-11-30 | End: 2017-12-03 | Stop reason: HOSPADM

## 2017-12-03 NOTE — PROGRESS NOTES
Past Medical History:   Diagnosis Date    CHF (congestive heart failure)     Coronary artery disease     Depression     Fractures        Past Surgical History:   Procedure Laterality Date    BACK SURGERY      BLADDER SUSPENSION      BREAST SURGERY      CHOLECYSTECTOMY      HIP SURGERY      HYSTERECTOMY      KNEE SURGERY Right     1999    TONSILLECTOMY         Current Outpatient Prescriptions   Medication Sig    artificial tears (ISOPTO TEARS) 0.5 % ophthalmic solution Place 1 drop into both eyes 3 (three) times daily as needed.    aspirin 81 MG Chew Take 81 mg by mouth once daily.      carvedilol (COREG) 3.125 MG tablet Take 3.125 mg by mouth 2 (two) times daily.    citalopram (CELEXA) 40 MG tablet     coenzyme Q10 100 mg capsule Take 100 mg by mouth once daily.    cyanocobalamin (VITAMIN B-12) 500 MCG tablet Take 500 mcg by mouth once daily.    diclofenac sodium 1 % Gel Apply 2 g topically 2 (two) times daily.    dicyclomine (BENTYL) 10 MG capsule     MAGNESIUM, ALUMINUM HYDROXIDE (MAG-AL ORAL) Take 500 mg by mouth once daily.    multivitamin capsule Take 1 capsule by mouth once daily.    nitroGLYCERIN (NITROSTAT) 0.4 MG SL tablet     omega-3 acid ethyl esters (LOVAZA) 1 gram capsule Take 2 g by mouth 2 (two) times daily.    polyethylene glycol (GLYCOLAX) 17 gram PwPk Take by mouth.    rOPINIRole (REQUIP) 1 MG tablet Take 1 mg by mouth every evening.    SANTYL ointment APPLY TO AFFECTED AREA DAILY    tramadol (ULTRAM) 50 mg tablet      No current facility-administered medications for this visit.        Review of patient's allergies indicates:  No Known Allergies    History reviewed. No pertinent family history.    Social History     Social History    Marital status:      Spouse name: N/A    Number of children: N/A    Years of education: N/A     Occupational History    Not on file.     Social History Main Topics    Smoking status: Never Smoker    Smokeless tobacco: Never  Used    Alcohol use No    Drug use: No    Sexual activity: No     Other Topics Concern    Not on file     Social History Narrative    No narrative on file       Chief Complaint:   Chief Complaint   Patient presents with    Hand Pain     bilateral        History: This in a 88-year-old female comes in with left shoulder pain now.  Pain has been present for about a year.  No injury noted.  Pain is getting significantly worse.  She is unable to lay on that shoulder raise her arm above her head.  Patient enters a crunching noise.  Pain is not constant but pretty significant.  She rates her pain as a 7 out of 10.  No prior treatment noted.    Present: She comes in today for worsening bilateral hand pain.  Patient has a history of carpal tunnel syndrome.  We injected both carpal tunnels previously with decent success.  She got several months of relief.  Pain over the last few weeks.  Pain is back up to 10 out of 10.      Review of Systems:      Musculoskeletal: See history of present illness.      Physical Examination:    Vital Signs:    Vitals:    11/30/17 1456   BP: (!) 124/58   Pulse: (!) 49       This a well-developed, well nourished patient in no acute distress.  They are alert and oriented and cooperative to examination.  Pt. walks without an antalgic gait.      Examination of bilateral hand and wrist shows no signs of rashes or erythema. Patient has no masses ecchymosis or effusions. Patient has full range of motion of the wrist in flexion and extension as well as ulnar and radial deviation. The patient also has full range of motion of all joints in the hand. There are 2+ radial pulse and intact light touch sensation in all 5 digits.  Positive median Tinel's.    X-rays: 4 views of the left shoulder are reviewed which show arthritis and findings consistent with rotator cuff arthropathy  3 views of bilateral hands are reviewed which show severe arthritic changes with bony deformity.  X-rays of both knees are  reviewed which show some subluxation of the right knee possibly from polyethylene wear.  Severe left knee arthritis     Assessment::   Bilateral severe hand arthritis.   bilateral carpal tunnel syndrome    Plan:  I reviewed the findings the patient and her daughter today.  We talked about treatment options.  Injected both carpal tunnels again.

## 2017-12-03 NOTE — PROCEDURES
Carpal Tunnel  Date/Time: 11/30/2017 12:12 PM  Performed by: KADI BASURTO  Authorized by: KADI BASURTO     Consent Done?: Yes (Verbal)  Indications: Pain  Site marked: The procedure site was marked    Timeout: Prior to procedure the correct patient, procedure, and site was verified      Location:  Wrist (B carpal tunnel)  Site:  R radiocarpal and L radiocarpal  Prep: Patient was prepped and draped in usual sterile fashion    Needle size:  21 G  Approach:  Volar  Medications:  40 mg triamcinolone acetonide 40 mg/mL; 40 mg triamcinolone acetonide 40 mg/mL  Patient tolerance:  Patient tolerated the procedure well with no immediate complications

## 2018-01-08 ENCOUNTER — TELEPHONE (OUTPATIENT)
Dept: PAIN MEDICINE | Facility: CLINIC | Age: 83
End: 2018-01-08

## 2018-01-08 NOTE — TELEPHONE ENCOUNTER
Patient is s/p Right Knee RFA. Reports 100% relief that began 3-4 weeks s/p procedure. Reported 80% relief with MBB. Patient instructed to call if needed. Patient voiced understanding.

## 2018-05-10 ENCOUNTER — TELEPHONE (OUTPATIENT)
Dept: ORTHOPEDICS | Facility: CLINIC | Age: 83
End: 2018-05-10

## 2018-05-10 NOTE — TELEPHONE ENCOUNTER
----- Message from Ann Gerber sent at 5/10/2018 12:41 PM CDT -----  Contact: daughter  Daughter - Anna Julian - [phone] is calling/patient was recently in [facility] for arthritis issues with her shoulder and daughter is asking to speak with the Nurse concerning this/please call

## 2018-05-10 NOTE — TELEPHONE ENCOUNTER
Pt daughter would like you to be aware that the pt went to [facility] ER to have blood drained off shoulder and has f/u scheduled with Dr. Kruse. Advised pt I would let you know.

## 2018-05-14 ENCOUNTER — TELEPHONE (OUTPATIENT)
Dept: ORTHOPEDICS | Facility: CLINIC | Age: 83
End: 2018-05-14

## 2018-05-14 NOTE — TELEPHONE ENCOUNTER
----- Message from Mercedez Sellers sent at 5/14/2018  9:46 AM CDT -----  Contact: Anna Julian (Daughter)  She is having bilateral shoulder pain, her daughter would like to be seen on Thursday afternoon. Please call her [phone]. thanks

## 2018-05-15 DIAGNOSIS — M25.511 BILATERAL SHOULDER PAIN, UNSPECIFIED CHRONICITY: Primary | ICD-10-CM

## 2018-05-15 DIAGNOSIS — M25.512 BILATERAL SHOULDER PAIN, UNSPECIFIED CHRONICITY: Primary | ICD-10-CM

## 2018-05-17 ENCOUNTER — HOSPITAL ENCOUNTER (OUTPATIENT)
Dept: RADIOLOGY | Facility: HOSPITAL | Age: 83
Discharge: HOME | End: 2018-05-17
Attending: ORTHOPAEDIC SURGERY
Payer: MEDICARE

## 2018-05-17 ENCOUNTER — OFFICE VISIT (OUTPATIENT)
Dept: ORTHOPEDICS | Facility: CLINIC | Age: 83
End: 2018-05-17
Payer: MEDICARE

## 2018-05-17 VITALS
BODY MASS INDEX: 25.44 KG/M2 | DIASTOLIC BLOOD PRESSURE: 65 MMHG | HEIGHT: 64 IN | SYSTOLIC BLOOD PRESSURE: 133 MMHG | WEIGHT: 149 LBS | HEART RATE: 68 BPM

## 2018-05-17 DIAGNOSIS — M25.512 BILATERAL SHOULDER PAIN, UNSPECIFIED CHRONICITY: ICD-10-CM

## 2018-05-17 DIAGNOSIS — M25.511 BILATERAL SHOULDER PAIN, UNSPECIFIED CHRONICITY: ICD-10-CM

## 2018-05-17 DIAGNOSIS — M75.100 ROTATOR CUFF TEAR ARTHROPATHY, UNSPECIFIED LATERALITY: Primary | ICD-10-CM

## 2018-05-17 DIAGNOSIS — M12.819 ROTATOR CUFF TEAR ARTHROPATHY, UNSPECIFIED LATERALITY: Primary | ICD-10-CM

## 2018-05-17 PROCEDURE — 99214 OFFICE O/P EST MOD 30 MIN: CPT | Mod: 25,S$GLB,, | Performed by: ORTHOPAEDIC SURGERY

## 2018-05-17 PROCEDURE — 73030 X-RAY EXAM OF SHOULDER: CPT | Mod: 26,LT,, | Performed by: RADIOLOGY

## 2018-05-17 PROCEDURE — 20610 DRAIN/INJ JOINT/BURSA W/O US: CPT | Mod: RT,S$GLB,, | Performed by: ORTHOPAEDIC SURGERY

## 2018-05-17 PROCEDURE — 73030 X-RAY EXAM OF SHOULDER: CPT | Mod: TC,PN,LT

## 2018-05-17 PROCEDURE — 99999 PR PBB SHADOW E&M-EST. PATIENT-LVL III: CPT | Mod: PBBFAC,,, | Performed by: ORTHOPAEDIC SURGERY

## 2018-05-17 RX ADMIN — TRIAMCINOLONE ACETONIDE 40 MG: 40 INJECTION, SUSPENSION INTRA-ARTICULAR; INTRAMUSCULAR at 07:05

## 2018-05-21 PROBLEM — M75.100 ROTATOR CUFF TEAR ARTHROPATHY: Status: ACTIVE | Noted: 2018-05-21

## 2018-05-21 PROBLEM — M12.819 ROTATOR CUFF TEAR ARTHROPATHY: Status: ACTIVE | Noted: 2018-05-21

## 2018-05-21 RX ORDER — TRIAMCINOLONE ACETONIDE 40 MG/ML
40 INJECTION, SUSPENSION INTRA-ARTICULAR; INTRAMUSCULAR
Status: DISCONTINUED | OUTPATIENT
Start: 2018-05-17 | End: 2018-05-21 | Stop reason: HOSPADM

## 2018-05-21 NOTE — PROGRESS NOTES
Past Medical History:   Diagnosis Date    CHF (congestive heart failure)     Coronary artery disease     Depression     Fractures        Past Surgical History:   Procedure Laterality Date    BACK SURGERY      BLADDER SUSPENSION      BREAST SURGERY      CHOLECYSTECTOMY      HIP SURGERY      HYSTERECTOMY      KNEE SURGERY Right     1999    TONSILLECTOMY         Current Outpatient Prescriptions   Medication Sig    artificial tears (ISOPTO TEARS) 0.5 % ophthalmic solution Place 1 drop into both eyes 3 (three) times daily as needed.    aspirin 81 MG Chew Take 81 mg by mouth once daily.      carvedilol (COREG) 3.125 MG tablet Take 3.125 mg by mouth 2 (two) times daily.    citalopram (CELEXA) 40 MG tablet     coenzyme Q10 100 mg capsule Take 100 mg by mouth once daily.    cyanocobalamin (VITAMIN B-12) 500 MCG tablet Take 500 mcg by mouth once daily.    diclofenac sodium 1 % Gel Apply 2 g topically 2 (two) times daily.    dicyclomine (BENTYL) 10 MG capsule     MAGNESIUM, ALUMINUM HYDROXIDE (MAG-AL ORAL) Take 500 mg by mouth once daily.    multivitamin capsule Take 1 capsule by mouth once daily.    nitroGLYCERIN (NITROSTAT) 0.4 MG SL tablet     omega-3 acid ethyl esters (LOVAZA) 1 gram capsule Take 2 g by mouth 2 (two) times daily.    polyethylene glycol (GLYCOLAX) 17 gram PwPk Take by mouth.    rOPINIRole (REQUIP) 1 MG tablet Take 1 mg by mouth every evening.    SANTYL ointment APPLY TO AFFECTED AREA DAILY    tramadol (ULTRAM) 50 mg tablet      No current facility-administered medications for this visit.        Review of patient's allergies indicates:  No Known Allergies    History reviewed. No pertinent family history.    Social History     Social History    Marital status:      Spouse name: N/A    Number of children: N/A    Years of education: N/A     Occupational History    Not on file.     Social History Main Topics    Smoking status: Never Smoker    Smokeless tobacco: Never  Used    Alcohol use No    Drug use: No    Sexual activity: No     Other Topics Concern    Not on file     Social History Narrative    No narrative on file       Chief Complaint:   Chief Complaint   Patient presents with    Right Shoulder - Pain    Left Shoulder - Pain       History: This in a 89-year-old female comes in with left shoulder pain now.  Pain has been present for about a year.  No injury noted.  Pain is getting significantly worse.  She is unable to lay on that shoulder raise her arm above her head.  Patient enters a crunching noise.  Pain is not constant but pretty significant.  She rates her pain as a 7 out of 10.  No prior treatment noted.    Present: She comes in today for worsening bilateral shoulder pain. Patient was seen at [facility] in had her right shoulder aspirated due to large hematoma.  Patient then fell a couple weeks ago and landed on her left shoulder.  Pain is currently an 8/10.  Right shoulder is the more symptomatic of the 2.      Review of Systems:  Musculoskeletal: See history of present illness.      Physical Examination:    Vital Signs:    Vitals:    05/17/18 1426   BP: 133/65   Pulse: 68       This a well-developed, well nourished patient in no acute distress.  They are alert and oriented and cooperative to examination.  Pt. walks without an antalgic gait.      Examination of bilateral shoulder show large swelling and fullness of the right shoulder.  Patient has tenderness and decreased range of motion of both shoulders.  4-out of 5 strength. 2+ radial pulse.  Intact light touch sensation throughout the hand.    X-rays:  X-rays of the left shoulder is ordered and reviewed which show possible acromial fracture as well as signs consistent with chronic rotator cuff arthropathy     Assessment::   Bilateral rotator cuff arthropathy with possible new left acromial fracture  Right shoulder fusion    Plan:  I reviewed the findings the patient and her daughter today.  We talked about  treatment options. I recommended aspiration and injection of her right shoulder today.  I was able to aspirate about 40 cc of blood-tinged fluid.  Talked about possible injection of the left shoulder if the right 1 is helpful.

## 2018-05-21 NOTE — PROCEDURES
Large Joint Aspiration/Injection  Date/Time: 5/17/2018 7:57 AM  Performed by: KADI BASURTO  Authorized by: KADI BASURTO     Consent Done?:  Yes (Verbal)  Indications:  Pain  Procedure site marked: Yes    Timeout: Prior to procedure the correct patient, procedure, and site was verified      Location:  Shoulder  Site:  R subacromial bursa  Prep: Patient was prepped and draped in usual sterile fashion    Ultrasonic Guidance for needle placement: No  Needle size:  20 G  Approach:  Posterior  Medications:  40 mg triamcinolone acetonide 40 mg/mL  Aspirate amount (ml):  40  Aspirate:  Blood-tinged  Patient tolerance:  Patient tolerated the procedure well with no immediate complications

## 2018-06-04 ENCOUNTER — OFFICE VISIT (OUTPATIENT)
Dept: ORTHOPEDICS | Facility: CLINIC | Age: 83
End: 2018-06-04
Payer: MEDICARE

## 2018-06-04 VITALS
HEIGHT: 64 IN | HEART RATE: 55 BPM | SYSTOLIC BLOOD PRESSURE: 133 MMHG | DIASTOLIC BLOOD PRESSURE: 60 MMHG | BODY MASS INDEX: 25.44 KG/M2 | WEIGHT: 149 LBS

## 2018-06-04 DIAGNOSIS — M19.012 OSTEOARTHRITIS OF LEFT GLENOHUMERAL JOINT: Primary | ICD-10-CM

## 2018-06-04 PROCEDURE — 20610 DRAIN/INJ JOINT/BURSA W/O US: CPT | Mod: LT,S$GLB,, | Performed by: ORTHOPAEDIC SURGERY

## 2018-06-04 PROCEDURE — 99999 PR PBB SHADOW E&M-EST. PATIENT-LVL III: CPT | Mod: PBBFAC,,, | Performed by: ORTHOPAEDIC SURGERY

## 2018-06-04 PROCEDURE — 99212 OFFICE O/P EST SF 10 MIN: CPT | Mod: 25,S$GLB,, | Performed by: ORTHOPAEDIC SURGERY

## 2018-06-04 RX ADMIN — TRIAMCINOLONE ACETONIDE 40 MG: 40 INJECTION, SUSPENSION INTRA-ARTICULAR; INTRAMUSCULAR at 03:06

## 2018-06-05 RX ORDER — TRIAMCINOLONE ACETONIDE 40 MG/ML
40 INJECTION, SUSPENSION INTRA-ARTICULAR; INTRAMUSCULAR
Status: SHIPPED | OUTPATIENT
Start: 2018-06-04 | End: 2018-06-04

## 2018-06-05 NOTE — PROGRESS NOTES
Past Medical History:   Diagnosis Date    CHF (congestive heart failure)     Coronary artery disease     Depression     Fractures        Past Surgical History:   Procedure Laterality Date    BACK SURGERY      BLADDER SUSPENSION      BREAST SURGERY      CHOLECYSTECTOMY      HIP SURGERY      HYSTERECTOMY      KNEE SURGERY Right     1999    TONSILLECTOMY         Current Outpatient Prescriptions   Medication Sig    artificial tears (ISOPTO TEARS) 0.5 % ophthalmic solution Place 1 drop into both eyes 3 (three) times daily as needed.    aspirin 81 MG Chew Take 81 mg by mouth once daily.      carvedilol (COREG) 3.125 MG tablet Take 3.125 mg by mouth 2 (two) times daily.    citalopram (CELEXA) 40 MG tablet     coenzyme Q10 100 mg capsule Take 100 mg by mouth once daily.    cyanocobalamin (VITAMIN B-12) 500 MCG tablet Take 500 mcg by mouth once daily.    diclofenac sodium 1 % Gel Apply 2 g topically 2 (two) times daily.    dicyclomine (BENTYL) 10 MG capsule     MAGNESIUM, ALUMINUM HYDROXIDE (MAG-AL ORAL) Take 500 mg by mouth once daily.    multivitamin capsule Take 1 capsule by mouth once daily.    nitroGLYCERIN (NITROSTAT) 0.4 MG SL tablet     omega-3 acid ethyl esters (LOVAZA) 1 gram capsule Take 2 g by mouth 2 (two) times daily.    polyethylene glycol (GLYCOLAX) 17 gram PwPk Take by mouth.    rOPINIRole (REQUIP) 1 MG tablet Take 1 mg by mouth every evening.    SANTYL ointment APPLY TO AFFECTED AREA DAILY    tramadol (ULTRAM) 50 mg tablet      No current facility-administered medications for this visit.        Review of patient's allergies indicates:  No Known Allergies    History reviewed. No pertinent family history.    Social History     Social History    Marital status:      Spouse name: N/A    Number of children: N/A    Years of education: N/A     Occupational History    Not on file.     Social History Main Topics    Smoking status: Never Smoker    Smokeless tobacco: Never  Used    Alcohol use No    Drug use: No    Sexual activity: No     Other Topics Concern    Not on file     Social History Narrative    No narrative on file       Chief Complaint:   Chief Complaint   Patient presents with    Left Shoulder - Pain       History: This in a 89-year-old female comes in with left shoulder pain now.  Pain has been present for about a year.  No injury noted.  Pain is getting significantly worse.  She is unable to lay on that shoulder raise her arm above her head.  Patient enters a crunching noise.  Pain is not constant but pretty significant.  She rates her pain as a 7 out of 10.  No prior treatment noted.    Present: She comes in today for left shoulder pain. We injected her right shoulder at work.  She would like to try 1 on her left.    Review of Systems:  Musculoskeletal: See history of present illness.      Physical Examination:    Vital Signs:    Vitals:    06/04/18 1421   BP: 133/60   Pulse: (!) 55       This a well-developed, well nourished patient in no acute distress.  They are alert and oriented and cooperative to examination.  Pt. walks without an antalgic gait.      Examination of bilateral shoulder show no real fullness or swelling of either shoulder.  Patient has tenderness and decreased range of motion of both shoulders.  4-out of 5 strength. 2+ radial pulse.  Intact light touch sensation throughout the hand.    X-rays:  X-rays of the left shoulder is reviewed which show possible acromial fracture as well as signs consistent with chronic rotator cuff arthropathy     Assessment::   Bilateral rotator cuff arthropathy with possible new left acromial fracture  Right shoulder fusion    Plan:  I injected her left shoulder.  Follow up as needed.

## 2018-06-05 NOTE — PROCEDURES
Large Joint Aspiration/Injection  Date/Time: 6/4/2018 3:03 PM  Performed by: KADI BASURTO  Authorized by: KADI BASURTO     Consent Done?:  Yes (Verbal)  Indications:  Pain  Procedure site marked: Yes    Timeout: Prior to procedure the correct patient, procedure, and site was verified      Location:  Shoulder  Site:  L subacromial bursa  Prep: Patient was prepped and draped in usual sterile fashion    Ultrasonic Guidance for needle placement: No  Needle size:  20 G  Approach:  Posterior  Medications:  40 mg triamcinolone acetonide 40 mg/mL  Patient tolerance:  Patient tolerated the procedure well with no immediate complications

## 2018-06-26 ENCOUNTER — TELEPHONE (OUTPATIENT)
Dept: ORTHOPEDICS | Facility: CLINIC | Age: 83
End: 2018-06-26

## 2018-07-03 ENCOUNTER — OFFICE VISIT (OUTPATIENT)
Dept: PODIATRY | Facility: CLINIC | Age: 83
End: 2018-07-03
Payer: MEDICARE

## 2018-07-03 ENCOUNTER — HOSPITAL ENCOUNTER (OUTPATIENT)
Dept: RADIOLOGY | Facility: HOSPITAL | Age: 83
Discharge: HOME | End: 2018-07-03
Attending: PODIATRIST
Payer: MEDICARE

## 2018-07-03 VITALS
BODY MASS INDEX: 25.45 KG/M2 | HEIGHT: 64 IN | WEIGHT: 149.06 LBS | SYSTOLIC BLOOD PRESSURE: 124 MMHG | DIASTOLIC BLOOD PRESSURE: 64 MMHG | HEART RATE: 60 BPM

## 2018-07-03 DIAGNOSIS — L97.429: ICD-10-CM

## 2018-07-03 DIAGNOSIS — I73.9 PAD (PERIPHERAL ARTERY DISEASE): ICD-10-CM

## 2018-07-03 DIAGNOSIS — L97.509 ULCER OF TOE, UNSPECIFIED LATERALITY, UNSPECIFIED ULCER STAGE: ICD-10-CM

## 2018-07-03 DIAGNOSIS — L97.509 ULCER OF TOE, UNSPECIFIED LATERALITY, UNSPECIFIED ULCER STAGE: Primary | ICD-10-CM

## 2018-07-03 PROCEDURE — 99203 OFFICE O/P NEW LOW 30 MIN: CPT | Mod: S$GLB,,, | Performed by: PODIATRIST

## 2018-07-03 PROCEDURE — 93922 UPR/L XTREMITY ART 2 LEVELS: CPT | Mod: TC

## 2018-07-03 PROCEDURE — 73630 X-RAY EXAM OF FOOT: CPT | Mod: 26,RT,, | Performed by: RADIOLOGY

## 2018-07-03 PROCEDURE — 73630 X-RAY EXAM OF FOOT: CPT | Mod: 26,LT,, | Performed by: RADIOLOGY

## 2018-07-03 PROCEDURE — 93922 UPR/L XTREMITY ART 2 LEVELS: CPT | Mod: 26,,, | Performed by: RADIOLOGY

## 2018-07-03 PROCEDURE — 93925 LOWER EXTREMITY STUDY: CPT | Mod: 26,,, | Performed by: RADIOLOGY

## 2018-07-03 PROCEDURE — 99999 PR PBB SHADOW E&M-EST. PATIENT-LVL III: CPT | Mod: PBBFAC,,, | Performed by: PODIATRIST

## 2018-07-03 PROCEDURE — 73630 X-RAY EXAM OF FOOT: CPT | Mod: 50,TC,FY

## 2018-07-03 RX ORDER — LIDOCAINE HYDROCHLORIDE 20 MG/ML
JELLY TOPICAL
Qty: 30 ML | Refills: 2 | Status: SHIPPED | OUTPATIENT
Start: 2018-07-03

## 2018-07-03 NOTE — PROGRESS NOTES
Subjective:      Patient ID: Jessenia Mckeon is a 89 y.o. female.    Chief Complaint: Foot Problem (spot on tip of left great toe and left heel tender)    Small dry sores and pain toes 1 left, 2,3 right and left heel.  Gradual onset, unchanging over past couple weeks, aggravated by increased weight bearing, shoe gear, pressure.  No previous medical treatment.  No self treatment. Denies trauma, surgery both feet.    Review of Systems   Constitution: Negative for chills, diaphoresis, fever, malaise/fatigue and night sweats.   Cardiovascular: Negative for claudication, cyanosis, leg swelling and syncope.   Skin: Positive for poor wound healing. Negative for color change, dry skin, nail changes, rash, suspicious lesions and unusual hair distribution.   Musculoskeletal: Negative for falls, joint pain, joint swelling, muscle cramps, muscle weakness and stiffness.   Gastrointestinal: Negative for constipation, diarrhea, nausea and vomiting.   Neurological: Negative for brief paralysis, disturbances in coordination, focal weakness, numbness, paresthesias, sensory change and tremors.           Objective:      Physical Exam   Constitutional: She is oriented to person, place, and time. She appears well-developed and well-nourished. She is cooperative.   Oriented to time, place, and person.   Cardiovascular:   Pulses:       Dorsalis pedis pulses are 0 on the right side, and 0 on the left side.        Posterior tibial pulses are 1+ on the right side, and 1+ on the left side.   Pulses thready.    Cap fill 6 seconds both feet.   Musculoskeletal:   Minimally ambulatory. Decreased stride length, early heel off, moderately propulsive toe off bilateral.    All ten toes without clubbing, cyanosis, or signs of ischemia.      No focal pain to palpation bilateral lower extremities.      Range of motion, stability, muscle strength, and muscle tone are age and health appropriate normal bilateral feet and legs.       Lymphadenopathy:    Negative lymphadenopathy bilateral popliteal fossa and tarsal tunnel.  Negative lymphangitic streaking bilateral foot/ankle bilateral.     Neurological: She is alert and oriented to person, place, and time. She has normal strength. She is not disoriented. She displays atrophy. She displays no tremor. She exhibits normal muscle tone.     Negative tinel sign to percussion sural, superficial peroneal, deep peroneal, saphenous, and posterior tibial nerves right and left ankles and feet.     Skin: Skin is warm, dry and intact. No abrasion, no bruising, no burn, no ecchymosis, no laceration, no lesion, no petechiae and no rash noted. She is not diaphoretic. No cyanosis or erythema. No pallor. Nails show no clubbing.   Small, dry, stable tan eschar tip left hallux, dorsal pipj 2,3 right, and posterior inferior left heel, all <5mm diameter, all  without open ulceration, drainage, pus, tracking, fluctuance, malodor, or cardinal signs infection.     Otherwise, Skin thin, shiny, atrophic, with decreased density and distribution of pedal hair bilateral, but without hyperpigmentation, roscoe discoloration,  ulcers, masses, nodules or cords palpated bilateral feet and legs.      Toenails 1st, 2nd, 3rd, 4th, 5th  bilateral are hypertrophic thickened 2-3 mm, dystrophic, discolored tanish brown with tan, gray crumbly subungual debris.  Tender to distal nail plate pressure, without periungual skin abnormality of each.               Assessment:       Encounter Diagnoses   Name Primary?    Ulcer of toe, unspecified laterality, unspecified ulcer stage Yes    Ulcer of left heel, with unspecified severity     PAD (peripheral artery disease)          Plan:       Jessenia was seen today for foot problem.    Diagnoses and all orders for this visit:    Ulcer of toe, unspecified laterality, unspecified ulcer stage  -     ORTHOTIC DEVICE (DME)  -     X-Ray Foot Complete Bilateral; Future  -     US Lower Extrem Arteries Bilat with MACIEL;  Future    Ulcer of left heel, with unspecified severity  -     ORTHOTIC DEVICE (DME)  -     X-Ray Foot Complete Bilateral; Future  -     US Lower Extrem Arteries Bilat with MACIEL; Future    PAD (peripheral artery disease)  -     ORTHOTIC DEVICE (DME)  -     X-Ray Foot Complete Bilateral; Future  -     US Lower Extrem Arteries Bilat with MACIEL; Future    Other orders  -     lidocaine HCL 2% (XYLOCAINE) 2 % jelly; Apply topically as needed. Apply topically once nightly to affected part of foot/feet.      I counseled the patient on her conditions, their implications and medical management.    Swab wounds with betadine once daily.      Use heel cushion boots all times not ambulatory.    Discussed conservative treatment with shoes of adequate dimensions, material, and style to alleviate symptoms and delay or prevent surgical intervention.    rtc 1 week, sooner prn.          Follow-up in about 1 week (around 7/10/2018).

## 2018-07-10 ENCOUNTER — OFFICE VISIT (OUTPATIENT)
Dept: PODIATRY | Facility: CLINIC | Age: 83
End: 2018-07-10
Payer: MEDICARE

## 2018-07-10 VITALS — HEIGHT: 62 IN | WEIGHT: 149.06 LBS | RESPIRATION RATE: 14 BRPM | BODY MASS INDEX: 27.43 KG/M2

## 2018-07-10 DIAGNOSIS — L97.509 ULCER OF TOE, UNSPECIFIED LATERALITY, UNSPECIFIED ULCER STAGE: Primary | ICD-10-CM

## 2018-07-10 DIAGNOSIS — L97.429: ICD-10-CM

## 2018-07-10 DIAGNOSIS — B35.1 ONYCHOMYCOSIS DUE TO DERMATOPHYTE: ICD-10-CM

## 2018-07-10 DIAGNOSIS — I73.9 PAD (PERIPHERAL ARTERY DISEASE): ICD-10-CM

## 2018-07-10 PROCEDURE — 99999 PR PBB SHADOW E&M-EST. PATIENT-LVL III: CPT | Mod: PBBFAC,,, | Performed by: PODIATRIST

## 2018-07-10 PROCEDURE — 99212 OFFICE O/P EST SF 10 MIN: CPT | Mod: S$GLB,,, | Performed by: PODIATRIST

## 2018-07-10 PROCEDURE — 17999 UNLISTD PX SKN MUC MEMB SUBQ: CPT | Mod: CSM,,, | Performed by: PODIATRIST

## 2018-07-10 RX ORDER — TRIAMCINOLONE ACETONIDE 1 MG/G
CREAM TOPICAL 2 TIMES DAILY
Refills: 3 | COMMUNITY
Start: 2018-07-01

## 2018-07-10 NOTE — PROGRESS NOTES
Subjective:      Patient ID: Jessenia Mckeon is a 89 y.o. female.    Chief Complaint: Foot Ulcer    Small dry sores and pain toes 1 left, 2,3 right and left heel.  Gradual onset, unchanging over past couple weeks, aggravated by increased weight bearing, shoe gear, pressure.  No previous medical treatment.  No self treatment. Denies trauma, surgery both feet.  Arterial dopplers show PAD, verify with Dr. Huber that this is expected.  Didn't bring in current heel boots.  Review of Systems   Constitution: Negative for chills, diaphoresis, fever, malaise/fatigue and night sweats.   Cardiovascular: Negative for claudication, cyanosis, leg swelling and syncope.   Skin: Positive for poor wound healing. Negative for color change, dry skin, nail changes, rash, suspicious lesions and unusual hair distribution.   Musculoskeletal: Negative for falls, joint pain, joint swelling, muscle cramps, muscle weakness and stiffness.   Gastrointestinal: Negative for constipation, diarrhea, nausea and vomiting.   Neurological: Negative for brief paralysis, disturbances in coordination, focal weakness, numbness, paresthesias, sensory change and tremors.           Objective:      Physical Exam   Constitutional: She is oriented to person, place, and time. She appears well-developed and well-nourished. She is cooperative.   Oriented to time, place, and person.   Cardiovascular:   Pulses:       Dorsalis pedis pulses are 0 on the right side, and 0 on the left side.        Posterior tibial pulses are 1+ on the right side, and 1+ on the left side.   Pulses thready.    Cap fill 6 seconds both feet.   Musculoskeletal:   Minimally ambulatory. Decreased stride length, early heel off, moderately propulsive toe off bilateral.    All ten toes without clubbing, cyanosis, or signs of ischemia.      No focal pain to palpation bilateral lower extremities.      Range of motion, stability, muscle strength, and muscle tone are age and health appropriate  normal bilateral feet and legs.       Lymphadenopathy:   Negative lymphadenopathy bilateral popliteal fossa and tarsal tunnel.  Negative lymphangitic streaking bilateral foot/ankle bilateral.     Neurological: She is alert and oriented to person, place, and time. She has normal strength. She is not disoriented. She displays atrophy. She displays no tremor. She exhibits normal muscle tone.     Negative tinel sign to percussion sural, superficial peroneal, deep peroneal, saphenous, and posterior tibial nerves right and left ankles and feet.     Skin: Skin is warm, dry and intact. No abrasion, no bruising, no burn, no ecchymosis, no laceration, no lesion, no petechiae and no rash noted. She is not diaphoretic. No cyanosis or erythema. No pallor. Nails show no clubbing.   Small, dry, stable tan eschar tip left hallux, dorsal pipj 2,3 right, and posterior inferior left heel, all <5mm diameter, all  without open ulceration, drainage, pus, tracking, fluctuance, malodor, or cardinal signs infection.     Otherwise, Skin thin, shiny, atrophic, with decreased density and distribution of pedal hair bilateral, but without hyperpigmentation, roscoe discoloration,  ulcers, masses, nodules or cords palpated bilateral feet and legs.      Toenails 1st, 2nd, 3rd, 4th, 5th  bilateral are hypertrophic thickened 2-3 mm, dystrophic, discolored tanish brown with tan, gray crumbly subungual debris.  Tender to distal nail plate pressure, without periungual skin abnormality of each.               Assessment:       Encounter Diagnoses   Name Primary?    Ulcer of toe, unspecified laterality, unspecified ulcer stage Yes    Ulcer of left heel, with unspecified severity     PAD (peripheral artery disease)     Onychomycosis due to dermatophyte          Plan:       Jessenia was seen today for foot ulcer.    Diagnoses and all orders for this visit:    Ulcer of toe, unspecified laterality, unspecified ulcer stage    Ulcer of left heel, with  unspecified severity    PAD (peripheral artery disease)    Onychomycosis due to dermatophyte      I counseled the patient on her conditions, their implications and medical management.    Follow with Dr. Huber to verify expected arterial doppler findings or need for appointment.    Swab wounds with betadine once daily.      Use heel cushion boots all times not ambulatory.  Bring to clinic for evaluation.    Discussed conservative treatment with shoes of adequate dimensions, material, and style to alleviate symptoms and delay or prevent surgical intervention.    rtc 2 week, sooner prn.    With the patient's permission, I debrided all ten toenails with a sterile nipper and curette, removing all offending nail and debris.  Patient tolerated the procedure well and related significant relief.              Follow-up in about 1 year (around 7/10/2019).

## 2019-02-20 RX ORDER — DICLOFENAC SODIUM 10 MG/G
2 GEL TOPICAL 2 TIMES DAILY
Qty: 1 TUBE | Refills: 2 | Status: SHIPPED | OUTPATIENT
Start: 2019-02-20

## 2019-02-20 NOTE — TELEPHONE ENCOUNTER
----- Message from Percy He sent at 2/20/2019  2:06 PM CST -----  Contact: pt daughter Anna  Type:  RX Refill Request    Who Called:  Anna  Refill or New Rx:  refill  RX Name and Strength:  Topical cream for arthritis   How is the patient currently taking it? (ex. 1XDay):    Is this a 30 day or 90 day RX:  30  Preferred Pharmacy with phone number: Mail order [facility]  Phone: [phone]     Local or Mail Order:  mail  Ordering Provider:    Ismael Call Back Number:  [phone]  Additional Information:  Anna was unable to name the prescription but could provide the information below.     Prescription Number: [id number]  Prescription on pump bottle: [id number] Cream (N)

## 2019-06-26 ENCOUNTER — TELEPHONE (OUTPATIENT)
Dept: ORTHOPEDICS | Facility: CLINIC | Age: 84
End: 2019-06-26

## 2019-06-26 NOTE — TELEPHONE ENCOUNTER
----- Message from Deedee Vernon MA sent at 6/26/2019  9:30 AM CDT -----  Contact: Anna Julian, daughter   Mother, passed away     Wants to thank our office for her care   [phone]

## 2019-06-26 NOTE — TELEPHONE ENCOUNTER
Returned call to pt daughter. Advised we are so sorry for her loss. Advised I will let Dr. Palacios know she called to thank him for his care and treatment of her mother. Pt daughter verbalized understanding.

## (undated) DEVICE — SYR 10CC LUER LOCK

## (undated) DEVICE — GLOVE SURG ULTRA TOUCH 7.5

## (undated) DEVICE — APPLICATOR CHLORAPREP CLR 10.5

## (undated) DEVICE — SYR DISP LL 5CC

## (undated) DEVICE — NDL SAFETY 25G X 1.5 ECLIPSE

## (undated) DEVICE — NDL HYPODERMIC BLUNT 18G 1.5IN

## (undated) DEVICE — NDL SPINAL SPINOCAN 22GX3.5

## (undated) DEVICE — SYS LABEL CORRECT MED

## (undated) DEVICE — TUBING MINIBORE EXTENSION